# Patient Record
Sex: FEMALE | Race: OTHER | URBAN - METROPOLITAN AREA
[De-identification: names, ages, dates, MRNs, and addresses within clinical notes are randomized per-mention and may not be internally consistent; named-entity substitution may affect disease eponyms.]

---

## 2017-03-01 ENCOUNTER — EMERGENCY (EMERGENCY)
Facility: HOSPITAL | Age: 57
LOS: 1 days | Discharge: HOME | End: 2017-03-01
Attending: EMERGENCY MEDICINE | Admitting: EMERGENCY MEDICINE
Payer: MEDICAID

## 2017-03-01 VITALS
HEIGHT: 62 IN | WEIGHT: 145.95 LBS | OXYGEN SATURATION: 100 % | SYSTOLIC BLOOD PRESSURE: 162 MMHG | TEMPERATURE: 98 F | RESPIRATION RATE: 18 BRPM | DIASTOLIC BLOOD PRESSURE: 73 MMHG | HEART RATE: 65 BPM

## 2017-03-01 VITALS — DIASTOLIC BLOOD PRESSURE: 70 MMHG | HEART RATE: 64 BPM | SYSTOLIC BLOOD PRESSURE: 162 MMHG

## 2017-03-01 DIAGNOSIS — M54.6 PAIN IN THORACIC SPINE: ICD-10-CM

## 2017-03-01 DIAGNOSIS — Z95.5 PRESENCE OF CORONARY ANGIOPLASTY IMPLANT AND GRAFT: Chronic | ICD-10-CM

## 2017-03-01 LAB
APPEARANCE UR: CLEAR — SIGNIFICANT CHANGE UP
APTT BLD: 27.6 SEC — SIGNIFICANT CHANGE UP (ref 27.5–37.4)
BACTERIA # UR AUTO: ABNORMAL /HPF
BASE EXCESS BLDV CALC-SCNC: 3.9 MMOL/L — HIGH (ref -2–2)
BASE EXCESS BLDV CALC-SCNC: 4.9 MMOL/L — HIGH (ref -2–2)
BASOPHILS # BLD AUTO: 0 K/UL — SIGNIFICANT CHANGE UP (ref 0–0.2)
BASOPHILS NFR BLD AUTO: 0.4 % — SIGNIFICANT CHANGE UP (ref 0–2)
BILIRUB UR-MCNC: NEGATIVE — SIGNIFICANT CHANGE UP
CA-I SERPL-SCNC: 1.19 MMOL/L — SIGNIFICANT CHANGE UP (ref 1.12–1.3)
CA-I SERPL-SCNC: 1.2 MMOL/L — SIGNIFICANT CHANGE UP (ref 1.12–1.3)
CHLORIDE BLDV-SCNC: 101 MMOL/L — SIGNIFICANT CHANGE UP (ref 96–108)
CHLORIDE BLDV-SCNC: 98 MMOL/L — SIGNIFICANT CHANGE UP (ref 96–108)
CK MB BLD-MCNC: 1.3 % — SIGNIFICANT CHANGE UP (ref 0–3.5)
CK MB CFR SERPL CALC: 2.4 NG/ML — SIGNIFICANT CHANGE UP (ref 0–3.8)
CK SERPL-CCNC: 187 U/L — HIGH (ref 25–170)
CO2 BLDV-SCNC: 29 MMOL/L — SIGNIFICANT CHANGE UP (ref 22–30)
CO2 BLDV-SCNC: 30 MMOL/L — SIGNIFICANT CHANGE UP (ref 22–30)
COLOR SPEC: SIGNIFICANT CHANGE UP
DIFF PNL FLD: NEGATIVE — SIGNIFICANT CHANGE UP
EOSINOPHIL # BLD AUTO: 0.2 K/UL — SIGNIFICANT CHANGE UP (ref 0–0.5)
EOSINOPHIL NFR BLD AUTO: 1.8 % — SIGNIFICANT CHANGE UP (ref 0–6)
EPI CELLS # UR: SIGNIFICANT CHANGE UP /HPF
GAS PNL BLDV: 137 MMOL/L — SIGNIFICANT CHANGE UP (ref 136–145)
GAS PNL BLDV: 139 MMOL/L — SIGNIFICANT CHANGE UP (ref 136–145)
GAS PNL BLDV: SIGNIFICANT CHANGE UP
GLUCOSE BLDV-MCNC: 203 MG/DL — HIGH (ref 70–99)
GLUCOSE BLDV-MCNC: 252 MG/DL — HIGH (ref 70–99)
GLUCOSE UR QL: 300
HCO3 BLDV-SCNC: 28 MMOL/L — SIGNIFICANT CHANGE UP (ref 21–29)
HCO3 BLDV-SCNC: 28 MMOL/L — SIGNIFICANT CHANGE UP (ref 21–29)
HCT VFR BLD CALC: 36.8 % — SIGNIFICANT CHANGE UP (ref 34.5–45)
HCT VFR BLDA CALC: 38 % — LOW (ref 39–50)
HCT VFR BLDA CALC: 41 % — SIGNIFICANT CHANGE UP (ref 39–50)
HGB BLD CALC-MCNC: 12.5 G/DL — SIGNIFICANT CHANGE UP (ref 11.5–15.5)
HGB BLD CALC-MCNC: 13.5 G/DL — SIGNIFICANT CHANGE UP (ref 11.5–15.5)
HGB BLD-MCNC: 13.2 G/DL — SIGNIFICANT CHANGE UP (ref 11.5–15.5)
INR BLD: 1.06 RATIO — SIGNIFICANT CHANGE UP (ref 0.88–1.16)
KETONES UR-MCNC: ABNORMAL
LACTATE BLDV-MCNC: 1.3 MMOL/L — SIGNIFICANT CHANGE UP (ref 0.7–2)
LACTATE BLDV-MCNC: 2.2 MMOL/L — HIGH (ref 0.7–2)
LEUKOCYTE ESTERASE UR-ACNC: NEGATIVE — SIGNIFICANT CHANGE UP
LYMPHOCYTES # BLD AUTO: 2.9 K/UL — SIGNIFICANT CHANGE UP (ref 1–3.3)
LYMPHOCYTES # BLD AUTO: 25.7 % — SIGNIFICANT CHANGE UP (ref 13–44)
MCHC RBC-ENTMCNC: 30 PG — SIGNIFICANT CHANGE UP (ref 27–34)
MCHC RBC-ENTMCNC: 35.9 GM/DL — SIGNIFICANT CHANGE UP (ref 32–36)
MCV RBC AUTO: 83.6 FL — SIGNIFICANT CHANGE UP (ref 80–100)
MONOCYTES # BLD AUTO: 0.7 K/UL — SIGNIFICANT CHANGE UP (ref 0–0.9)
MONOCYTES NFR BLD AUTO: 5.8 % — SIGNIFICANT CHANGE UP (ref 2–14)
NEUTROPHILS # BLD AUTO: 7.6 K/UL — HIGH (ref 1.8–7.4)
NEUTROPHILS NFR BLD AUTO: 66.4 % — SIGNIFICANT CHANGE UP (ref 43–77)
NITRITE UR-MCNC: NEGATIVE — SIGNIFICANT CHANGE UP
PCO2 BLDV: 38 MMHG — SIGNIFICANT CHANGE UP (ref 35–50)
PCO2 BLDV: 43 MMHG — SIGNIFICANT CHANGE UP (ref 35–50)
PH BLDV: 7.43 — SIGNIFICANT CHANGE UP (ref 7.35–7.45)
PH BLDV: 7.49 — HIGH (ref 7.35–7.45)
PH UR: 7.5 — SIGNIFICANT CHANGE UP (ref 4.8–8)
PLATELET # BLD AUTO: 231 K/UL — SIGNIFICANT CHANGE UP (ref 150–400)
PO2 BLDV: 22 MMHG — LOW (ref 25–45)
PO2 BLDV: 24 MMHG — LOW (ref 25–45)
POTASSIUM BLDV-SCNC: 4 MMOL/L — SIGNIFICANT CHANGE UP (ref 3.5–5)
POTASSIUM BLDV-SCNC: 4.2 MMOL/L — SIGNIFICANT CHANGE UP (ref 3.5–5)
PROT UR-MCNC: 100 MG/DL
PROTHROM AB SERPL-ACNC: 11.5 SEC — SIGNIFICANT CHANGE UP (ref 10–13.1)
RBC # BLD: 4.41 M/UL — SIGNIFICANT CHANGE UP (ref 3.8–5.2)
RBC # FLD: 11.2 % — SIGNIFICANT CHANGE UP (ref 10.3–14.5)
RBC CASTS # UR COMP ASSIST: SIGNIFICANT CHANGE UP /HPF (ref 0–2)
SAO2 % BLDV: 38 % — LOW (ref 67–88)
SAO2 % BLDV: 40 % — LOW (ref 67–88)
SP GR SPEC: 1.01 — SIGNIFICANT CHANGE UP (ref 1.01–1.02)
TROPONIN T SERPL-MCNC: <0.01 NG/ML — SIGNIFICANT CHANGE UP (ref 0–0.06)
UROBILINOGEN FLD QL: NEGATIVE — SIGNIFICANT CHANGE UP
WBC # BLD: 11.4 K/UL — HIGH (ref 3.8–10.5)
WBC # FLD AUTO: 11.4 K/UL — HIGH (ref 3.8–10.5)
WBC UR QL: SIGNIFICANT CHANGE UP /HPF (ref 0–5)

## 2017-03-01 PROCEDURE — 82803 BLOOD GASES ANY COMBINATION: CPT

## 2017-03-01 PROCEDURE — 99285 EMERGENCY DEPT VISIT HI MDM: CPT

## 2017-03-01 PROCEDURE — 71010: CPT | Mod: 26

## 2017-03-01 PROCEDURE — 93005 ELECTROCARDIOGRAM TRACING: CPT

## 2017-03-01 PROCEDURE — 71045 X-RAY EXAM CHEST 1 VIEW: CPT

## 2017-03-01 PROCEDURE — 80053 COMPREHEN METABOLIC PANEL: CPT

## 2017-03-01 PROCEDURE — 85730 THROMBOPLASTIN TIME PARTIAL: CPT

## 2017-03-01 PROCEDURE — 84132 ASSAY OF SERUM POTASSIUM: CPT

## 2017-03-01 PROCEDURE — 82553 CREATINE MB FRACTION: CPT

## 2017-03-01 PROCEDURE — 82435 ASSAY OF BLOOD CHLORIDE: CPT

## 2017-03-01 PROCEDURE — 87086 URINE CULTURE/COLONY COUNT: CPT

## 2017-03-01 PROCEDURE — 83605 ASSAY OF LACTIC ACID: CPT

## 2017-03-01 PROCEDURE — 85379 FIBRIN DEGRADATION QUANT: CPT

## 2017-03-01 PROCEDURE — 82550 ASSAY OF CK (CPK): CPT

## 2017-03-01 PROCEDURE — 84295 ASSAY OF SERUM SODIUM: CPT

## 2017-03-01 PROCEDURE — 85610 PROTHROMBIN TIME: CPT

## 2017-03-01 PROCEDURE — 85027 COMPLETE CBC AUTOMATED: CPT

## 2017-03-01 PROCEDURE — 85014 HEMATOCRIT: CPT

## 2017-03-01 PROCEDURE — 81001 URINALYSIS AUTO W/SCOPE: CPT

## 2017-03-01 PROCEDURE — 82947 ASSAY GLUCOSE BLOOD QUANT: CPT

## 2017-03-01 PROCEDURE — 82330 ASSAY OF CALCIUM: CPT

## 2017-03-01 PROCEDURE — 84484 ASSAY OF TROPONIN QUANT: CPT

## 2017-03-01 PROCEDURE — 99284 EMERGENCY DEPT VISIT MOD MDM: CPT | Mod: 25

## 2017-03-01 RX ORDER — SODIUM CHLORIDE 9 MG/ML
1000 INJECTION INTRAMUSCULAR; INTRAVENOUS; SUBCUTANEOUS ONCE
Qty: 0 | Refills: 0 | Status: COMPLETED | OUTPATIENT
Start: 2017-03-01 | End: 2017-03-01

## 2017-03-01 RX ADMIN — SODIUM CHLORIDE 1000 MILLILITER(S): 9 INJECTION INTRAMUSCULAR; INTRAVENOUS; SUBCUTANEOUS at 16:04

## 2017-03-01 NOTE — ED PROVIDER NOTE - CARE PLAN
Principal Discharge DX:	Back pain, unspecified back location, unspecified back pain laterality, unspecified chronicity  Instructions for follow-up, activity and diet:	Follow up with your Primary Care Physician within the next 2-3 days  Follow up with your Cardiologist Dr Lofton for cardiac workup within the next 2 days as planned  Bring a copy of your test results with you to your appointment  Continue your current medication regimen  Return to the Emergency Room if you experience new or worsening symptoms  Secondary Diagnosis:	Nausea

## 2017-03-01 NOTE — ED PROVIDER NOTE - PROGRESS NOTE DETAILS
patient reports normal nuc stress within the past 5 months. She follows w Cardiologist Dr Lofton w [facility] and has an appointment with him this week. Patient does not want to stay in the hospital for admission for cardiac workup despite a thorough explanation of the reasons why she may should have one within the next 48-72 hours. She has assured me that she will follow up with Dr Lofton within this timeframe.   -Adria Shea PA-C Admission recommended for pt's atypical back pain and vomitng due to concern for acute coronary syndrome.  Patient reports normal nuc stress within the past 5 months. She follows w Cardiologist Dr Lofton w [place] and has an appointment with him this week. Patient does not want to stay in the hospital for admission for cardiac workup despite a thorough explanation of the reasons why she may should have one within the next 48-72 hours. She has assured me that she will follow up with Dr Lofton within this timeframe.   -Adria Shea PA-C

## 2017-03-01 NOTE — ED PROVIDER NOTE - MEDICAL DECISION MAKING DETAILS
Atypical back pain, bilateral CVA/thoracic back.  Non-radiating.  A/W vomiting x 1 this AM.  Concerning risk factors including DM, HTN, hyperlipidemia, CKD.  Concern for atypical acute coronary syndrome, less likely PE/aortic dissection.  Pain mild, not colicky, and improving since this AM.  Labs, EKG, CXR, admit.  S/O pending w/u.  --BMM

## 2017-03-01 NOTE — ED ADULT NURSE NOTE - HARM RISK FACTORS
Check here if all serologies below were negative, non-reactive or immune. Otherwise select appropriate status. no

## 2017-03-01 NOTE — ED ADULT NURSE NOTE - PMH
Depression    Diabetes mellitus type 2 in nonobese    High cholesterol    HTN (hypertension)    Neuropathy    Stented coronary artery  x2

## 2017-03-01 NOTE — ED PROVIDER NOTE - PLAN OF CARE
Follow up with your Primary Care Physician within the next 2-3 days  Follow up with your Cardiologist Dr Lofton for cardiac workup within the next 2 days as planned  Bring a copy of your test results with you to your appointment  Continue your current medication regimen  Return to the Emergency Room if you experience new or worsening symptoms

## 2017-03-01 NOTE — ED ADULT NURSE NOTE - OBJECTIVE STATEMENT
pt with abdiel to er c/o some abd and flank pain with vomited x 1 yellow today denies dysuria skin warm dry hx diabetes x 20years with neuropathy sugar poor control alert verbal Turkmen speaking afebrile skin warm dry fs 242 on arrival sinus rythem on moniter labs sent with pt pending reval

## 2017-03-01 NOTE — ED PROVIDER NOTE - OBJECTIVE STATEMENT
56 F w DM, HTN, HLD, CAD, CKD presents w bilateral midback pain, and nausea since yesterday. She had one episode of vomiting since yesterday. No other complaints. 56 F w DM, HTN, HLD, CAD, CKD presents w bilateral midback pain, and nausea since yesterday. She had one episode of vomiting since yesterday. No other complaints.  States symptoms improving throughout course of the day, pain not related to exertion, may be related to change in position, and mild.  No trauma.  Pt unable to qualify pain well.

## 2017-03-02 LAB
CULTURE RESULTS: SIGNIFICANT CHANGE UP
SPECIMEN SOURCE: SIGNIFICANT CHANGE UP

## 2017-07-19 ENCOUNTER — EMERGENCY (EMERGENCY)
Facility: HOSPITAL | Age: 57
LOS: 1 days | Discharge: HOME | End: 2017-07-19
Attending: EMERGENCY MEDICINE | Admitting: EMERGENCY MEDICINE
Payer: MEDICAID

## 2017-07-19 VITALS
TEMPERATURE: 100 F | OXYGEN SATURATION: 99 % | RESPIRATION RATE: 20 BRPM | DIASTOLIC BLOOD PRESSURE: 64 MMHG | HEART RATE: 74 BPM | SYSTOLIC BLOOD PRESSURE: 106 MMHG

## 2017-07-19 DIAGNOSIS — Z79.02 LONG TERM (CURRENT) USE OF ANTITHROMBOTICS/ANTIPLATELETS: ICD-10-CM

## 2017-07-19 DIAGNOSIS — Z79.899 OTHER LONG TERM (CURRENT) DRUG THERAPY: ICD-10-CM
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
## 2017-07-19 DIAGNOSIS — S93.324A DISLOCATION OF TARSOMETATARSAL JOINT OF RIGHT FOOT, INITIAL ENCOUNTER: ICD-10-CM

## 2017-07-19 DIAGNOSIS — Z79.4 LONG TERM (CURRENT) USE OF INSULIN: ICD-10-CM
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
## 2017-07-19 DIAGNOSIS — Z95.5 PRESENCE OF CORONARY ANGIOPLASTY IMPLANT AND GRAFT: Chronic | ICD-10-CM

## 2017-07-19 DIAGNOSIS — I10 ESSENTIAL (PRIMARY) HYPERTENSION: ICD-10-CM

## 2017-07-19 DIAGNOSIS — E78.00 PURE HYPERCHOLESTEROLEMIA, UNSPECIFIED: ICD-10-CM
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
## 2017-07-19 DIAGNOSIS — S90.31XA CONTUSION OF RIGHT FOOT, INITIAL ENCOUNTER: ICD-10-CM

## 2017-07-19 DIAGNOSIS — E11.9 TYPE 2 DIABETES MELLITUS WITHOUT COMPLICATIONS: ICD-10-CM

## 2017-07-19 DIAGNOSIS — Z79.82 LONG TERM (CURRENT) USE OF ASPIRIN: ICD-10-CM

## 2017-07-19 PROCEDURE — 76377 3D RENDER W/INTRP POSTPROCES: CPT

## 2017-07-19 PROCEDURE — 73700 CT LOWER EXTREMITY W/O DYE: CPT | Mod: 26,RT

## 2017-07-19 PROCEDURE — 73700 CT LOWER EXTREMITY W/O DYE: CPT

## 2017-07-19 PROCEDURE — 76377 3D RENDER W/INTRP POSTPROCES: CPT | Mod: 26

## 2017-07-19 PROCEDURE — 73630 X-RAY EXAM OF FOOT: CPT

## 2017-07-19 PROCEDURE — 73630 X-RAY EXAM OF FOOT: CPT | Mod: 26,RT

## 2017-07-19 PROCEDURE — 99284 EMERGENCY DEPT VISIT MOD MDM: CPT | Mod: 25

## 2017-07-19 PROCEDURE — 99285 EMERGENCY DEPT VISIT HI MDM: CPT

## 2017-07-19 NOTE — CONSULT NOTE ADULT - ASSESSMENT
57F right foot dorsolateral lisfranc dislocation of mets 2 - 5 and possible medial dislocation of 1st met-medial cuneiform  - Pt seen and evaluated  - Vimal bulky dressing and posterior splint applied  - No signs of ischemic changes or concerns for compartment syndrome  - Will require ORIF once swelling is controlled  - Recommend tight control of diabetes  - Strict NWB to RLE with crutches  - Keep dressing CDI until follow-up  - Rest, Ice, Elevate, NSAID for pain and swelling control

## 2017-07-19 NOTE — ED PROVIDER NOTE - PHYSICAL EXAMINATION
Attending note. Patient is alert in no distress. Examination of the right lower extremity we'll see tender swelling of the dorsum of the right foot with maximal pain in the midfoot area. Patient has paresthesia in a stocking distribution. Distal pulses are strong. Patient has good capillary refill. Patient has healing abrasions on the dorsum of the foot.

## 2017-07-19 NOTE — ED PROVIDER NOTE - PLAN OF CARE
1. Keep splint intact until follow-up with podiatry this week  2. Continue to take tylenol for any pain as needed   3. Rest, ice and elevate your foot to ensure decrease of swelling   4. Follow-up with podiatry doctor Dr. Rachel Arora, at [phone].  5. Return to the ER for any new or worsening symptoms.

## 2017-07-19 NOTE — ED PROVIDER NOTE - PROGRESS NOTE DETAILS
podiatry saw and evaluated patient. recommending CT of foot for OR planning as outpatient, but reporting no surgery to be done today. patient splinted by podiatry and plan discussed with family who agrees with plan. pending CT at this time will d/c with this follow-up plan. -Mimi Mendez PA-C podiatry saw and evaluated patient. recommending CT of foot for OR planning as outpatient, but reporting no surgery to be done today. patient splinted by podiatry and plan discussed with family who agrees with plan. pending CT at this time will d/c with this follow-up plan. Podiatry resident states do not need to wait for CT results, only purpose is for OR planning will not cause change in plan.  -OLYA KenneyC

## 2017-07-19 NOTE — CONSULT NOTE ADULT - SUBJECTIVE AND OBJECTIVE BOX
Patient is a 57y old  Female who presents with a chief complaint of right foot swelling.    HPI: Pt injured her right foot 3 weeks ago. Patient has diabetic neuropathy and is complaining of pain and swelling with ecchymosis of the right foot. Patient was seen by her foot doctor and was told there were no fractures and followed in 6 weeks.  This is complaining of pain and swelling. Pain is exacerbated by weightbearing. Pt was given a wrapping for her foot and has been ambulating in regular shoes since her injury. Pt was going to see her podiatrist but pain and swelling worsened which made her come to the ED today.      PAST MEDICAL & SURGICAL HISTORY:  Depression  Neuropathy  High cholesterol  HTN (hypertension)  Stented coronary artery: x2  Diabetes mellitus type 2 in nonobese  History of coronary artery stent placement: x2      MEDICATIONS  (STANDING):    MEDICATIONS  (PRN):      Allergies    No Known Allergies    Intolerances        VITALS:    Vital Signs Last 24 Hrs  T(C): 37.5 (19 Jul 2017 14:18), Max: 37.5 (19 Jul 2017 14:18)  T(F): 99.5 (19 Jul 2017 14:18), Max: 99.5 (19 Jul 2017 14:18)  HR: 74 (19 Jul 2017 14:18) (74 - 74)  BP: 106/64 (19 Jul 2017 14:18) (106/64 - 106/64)  BP(mean): --  RR: 20 (19 Jul 2017 14:18) (20 - 20)  SpO2: 99% (19 Jul 2017 14:18) (99% - 99%)    LABS:                CAPILLARY BLOOD GLUCOSE              LOWER EXTREMITY PHYSICAL EXAM:    Vasular: DP/PT 2/4, B/L, CFT <2 seconds B/L, Temperature gradient WNL, B/L.   Neuro: Epicritic sensation absent to the level of ankle, B/L.  Musculoskeletal/Ortho: red and swollen right midfoot, no blanching or tenting of skin, no open lesions, ROM of each toes intact but decreased, no pain throughout ROM of toes, ecchymosis plantar midfoot, unable to weightbear due to pain.    RADIOLOGY & ADDITIONAL STUDIES:

## 2017-07-19 NOTE — ED PROVIDER NOTE - OBJECTIVE STATEMENT
Attending note. Patient was seen in fast track #3. Injured her right foot on June 27. Patient has diabetic neuropathy and is complaining of pain and swelling with ecchymosis of the right foot. Patient was seen by her foot doctor and was told there were no fractures and followed in 5 weeks.  This is complaining of pain and swelling. Pain is exacerbated by weightbearing.

## 2017-07-19 NOTE — ED PROVIDER NOTE - CARE PLAN
Principal Discharge DX:	Lisfranc dislocation, right, initial encounter  Instructions for follow-up, activity and diet:	1. Keep splint intact until follow-up with podiatry this week  2. Continue to take tylenol for any pain as needed   3. Rest, ice and elevate your foot to ensure decrease of swelling   4. Follow-up with podiatry doctor Dr. Rachel Arora, at [phone].  5. Return to the ER for any new or worsening symptoms. Principal Discharge DX:	Lisfranc dislocation, right, initial encounter  Instructions for follow-up, activity and diet:	1. Keep splint intact until follow-up with podiatry this week  2. Continue to take tylenol for any pain as needed   3. Rest, ice and elevate your foot to ensure decrease of swelling   4. Follow-up with podiatry doctor Dr. Rachel Arora, at [phone].  5. Return to the ER for any new or worsening symptoms.

## 2019-04-08 ENCOUNTER — EMERGENCY (EMERGENCY)
Facility: HOSPITAL | Age: 59
LOS: 1 days | Discharge: HOME | End: 2019-04-08
Attending: EMERGENCY MEDICINE
Payer: COMMERCIAL

## 2019-04-08 VITALS
SYSTOLIC BLOOD PRESSURE: 140 MMHG | TEMPERATURE: 98 F | HEART RATE: 59 BPM | DIASTOLIC BLOOD PRESSURE: 67 MMHG | OXYGEN SATURATION: 99 % | RESPIRATION RATE: 16 BRPM

## 2019-04-08 VITALS
DIASTOLIC BLOOD PRESSURE: 75 MMHG | HEART RATE: 64 BPM | TEMPERATURE: 98 F | HEIGHT: 61 IN | SYSTOLIC BLOOD PRESSURE: 157 MMHG | OXYGEN SATURATION: 100 % | WEIGHT: 149.91 LBS | RESPIRATION RATE: 18 BRPM

## 2019-04-08 DIAGNOSIS — Z95.5 PRESENCE OF CORONARY ANGIOPLASTY IMPLANT AND GRAFT: Chronic | ICD-10-CM

## 2019-04-08 PROBLEM — G62.9 POLYNEUROPATHY, UNSPECIFIED: Chronic | Status: ACTIVE | Noted: 2017-03-01

## 2019-04-08 PROBLEM — I10 ESSENTIAL (PRIMARY) HYPERTENSION: Chronic | Status: ACTIVE | Noted: 2017-03-01

## 2019-04-08 PROBLEM — E78.00 PURE HYPERCHOLESTEROLEMIA, UNSPECIFIED: Chronic | Status: ACTIVE | Noted: 2017-03-01

## 2019-04-08 PROBLEM — F32.9 MAJOR DEPRESSIVE DISORDER, SINGLE EPISODE, UNSPECIFIED: Chronic | Status: ACTIVE | Noted: 2017-03-01

## 2019-04-08 PROBLEM — E11.9 TYPE 2 DIABETES MELLITUS WITHOUT COMPLICATIONS: Chronic | Status: ACTIVE | Noted: 2017-03-01

## 2019-04-08 LAB
ALBUMIN SERPL ELPH-MCNC: 4.3 G/DL — SIGNIFICANT CHANGE UP (ref 3.3–5)
ALP SERPL-CCNC: 66 U/L — SIGNIFICANT CHANGE UP (ref 40–120)
ALT FLD-CCNC: 17 U/L — SIGNIFICANT CHANGE UP (ref 10–45)
ANION GAP SERPL CALC-SCNC: 17 MMOL/L — SIGNIFICANT CHANGE UP (ref 5–17)
AST SERPL-CCNC: 19 U/L — SIGNIFICANT CHANGE UP (ref 10–40)
BASOPHILS # BLD AUTO: 0.1 K/UL — SIGNIFICANT CHANGE UP (ref 0–0.2)
BASOPHILS NFR BLD AUTO: 0.9 % — SIGNIFICANT CHANGE UP (ref 0–2)
BILIRUB SERPL-MCNC: 0.3 MG/DL — SIGNIFICANT CHANGE UP (ref 0.2–1.2)
BUN SERPL-MCNC: 21 MG/DL — SIGNIFICANT CHANGE UP (ref 7–23)
CALCIUM SERPL-MCNC: 10.2 MG/DL — SIGNIFICANT CHANGE UP (ref 8.4–10.5)
CHLORIDE SERPL-SCNC: 96 MMOL/L — SIGNIFICANT CHANGE UP (ref 96–108)
CO2 SERPL-SCNC: 23 MMOL/L — SIGNIFICANT CHANGE UP (ref 22–31)
CREAT SERPL-MCNC: 0.87 MG/DL — SIGNIFICANT CHANGE UP (ref 0.5–1.3)
EOSINOPHIL # BLD AUTO: 0.6 K/UL — HIGH (ref 0–0.5)
EOSINOPHIL NFR BLD AUTO: 7.6 % — HIGH (ref 0–6)
GLUCOSE SERPL-MCNC: 311 MG/DL — HIGH (ref 70–99)
HCT VFR BLD CALC: 32.4 % — LOW (ref 34.5–45)
HGB BLD-MCNC: 11.2 G/DL — LOW (ref 11.5–15.5)
LYMPHOCYTES # BLD AUTO: 2.3 K/UL — SIGNIFICANT CHANGE UP (ref 1–3.3)
LYMPHOCYTES # BLD AUTO: 31.8 % — SIGNIFICANT CHANGE UP (ref 13–44)
MCHC RBC-ENTMCNC: 28.7 PG — SIGNIFICANT CHANGE UP (ref 27–34)
MCHC RBC-ENTMCNC: 34.7 GM/DL — SIGNIFICANT CHANGE UP (ref 32–36)
MCV RBC AUTO: 82.6 FL — SIGNIFICANT CHANGE UP (ref 80–100)
MONOCYTES # BLD AUTO: 0.4 K/UL — SIGNIFICANT CHANGE UP (ref 0–0.9)
MONOCYTES NFR BLD AUTO: 5.4 % — SIGNIFICANT CHANGE UP (ref 2–14)
NEUTROPHILS # BLD AUTO: 3.9 K/UL — SIGNIFICANT CHANGE UP (ref 1.8–7.4)
NEUTROPHILS NFR BLD AUTO: 54.3 % — SIGNIFICANT CHANGE UP (ref 43–77)
PLATELET # BLD AUTO: 346 K/UL — SIGNIFICANT CHANGE UP (ref 150–400)
POTASSIUM SERPL-MCNC: 5.4 MMOL/L — HIGH (ref 3.5–5.3)
POTASSIUM SERPL-SCNC: 5.4 MMOL/L — HIGH (ref 3.5–5.3)
PROT SERPL-MCNC: 7.5 G/DL — SIGNIFICANT CHANGE UP (ref 6–8.3)
RAPID RVP RESULT: DETECTED
RBC # BLD: 3.92 M/UL — SIGNIFICANT CHANGE UP (ref 3.8–5.2)
RBC # FLD: 11 % — SIGNIFICANT CHANGE UP (ref 10.3–14.5)
RV+EV RNA SPEC QL NAA+PROBE: DETECTED
SODIUM SERPL-SCNC: 136 MMOL/L — SIGNIFICANT CHANGE UP (ref 135–145)
WBC # BLD: 7.3 K/UL — SIGNIFICANT CHANGE UP (ref 3.8–10.5)
WBC # FLD AUTO: 7.3 K/UL — SIGNIFICANT CHANGE UP (ref 3.8–10.5)

## 2019-04-08 PROCEDURE — 87486 CHLMYD PNEUM DNA AMP PROBE: CPT

## 2019-04-08 PROCEDURE — 87581 M.PNEUMON DNA AMP PROBE: CPT

## 2019-04-08 PROCEDURE — 80053 COMPREHEN METABOLIC PANEL: CPT

## 2019-04-08 PROCEDURE — 71260 CT THORAX DX C+: CPT

## 2019-04-08 PROCEDURE — 87798 DETECT AGENT NOS DNA AMP: CPT

## 2019-04-08 PROCEDURE — 99284 EMERGENCY DEPT VISIT MOD MDM: CPT | Mod: 25

## 2019-04-08 PROCEDURE — 85027 COMPLETE CBC AUTOMATED: CPT

## 2019-04-08 PROCEDURE — 93005 ELECTROCARDIOGRAM TRACING: CPT

## 2019-04-08 PROCEDURE — 71046 X-RAY EXAM CHEST 2 VIEWS: CPT

## 2019-04-08 PROCEDURE — 93010 ELECTROCARDIOGRAM REPORT: CPT

## 2019-04-08 PROCEDURE — 71046 X-RAY EXAM CHEST 2 VIEWS: CPT | Mod: 26

## 2019-04-08 PROCEDURE — 71260 CT THORAX DX C+: CPT | Mod: 26

## 2019-04-08 PROCEDURE — 87633 RESP VIRUS 12-25 TARGETS: CPT

## 2019-04-08 NOTE — ED PROVIDER NOTE - OBJECTIVE STATEMENT
60 yo female, hx of pulmonary abscess drained in [place] 6 years ago, p/w cough x 1 month, productive yellow sputum, on PO abx/decongestants as an outpatient from urgent care without relief, subjective fevers, no LOC, no diarrhea, no sick contacts, endorses intermittent mild chest wall pain when coughing, none at rest.

## 2019-04-08 NOTE — ED ADULT TRIAGE NOTE - CHIEF COMPLAINT QUOTE
coughing yellow sputum x1 month with SOB and rib pain. seen by PCP and urgent care- given zpack 10 days ago and completed course with no improvement. patient denies fevers.

## 2019-04-08 NOTE — ED PROVIDER NOTE - NSFOLLOWUPINSTRUCTIONS_ED_ALL_ED_FT
FOLLOW UP WITH DR. ARCHULETA IN THE NEXT 24-48H ([phone]).     CONTINUE TO TAKE MUCINEX AS NEEDED FOR CONGESTION.    RETURN TO THE ED FOR ANY NEW OR WORSENING SYMPTOMS.

## 2019-04-08 NOTE — ED PROVIDER NOTE - INTERPRETATION
EKG reviewed for rate, rhythm, axis, intervals and segments, including QRS morphology, P wave appearance T wave appearance, HI interval, and QT interval.  I find the EKG to be unremarkable in all of these regards except as follows: poor R progression (?lead placement given large R in V4), borderline bradycardia

## 2019-04-08 NOTE — ED ADULT NURSE NOTE - OBJECTIVE STATEMENT
60 y/o Female presenting to the ED ambulatory, A&Ox3, complaining of a cough for over a month. Pt reports about a month ago pt developed a fever and cough and was given antibiotics and other medications and the symptoms have not all resolved. Pt denies fever since she started any medication. Pt reports a chest pain and pain in her ribs when she coughs. Pt reports feeling short of breath at times. Pt has a non-productive cough at this time. After nebulizer treatments pt has yellow sputum that is able to come up but not without it. Lung sounds unable to be auscultated at this time because patient coughs with each deep breath. Heart sounds WNL. Pt denies dizziness, headache, weakness, N/V/D, numbness, tingling, congestion, fever, chills, sick contacts, recent travel. Pt appears to be in no current distress. Pt was Qdocked and presented to primary RN with a 20 in her left AC, flushes and pulls back. Pt has already gone to X-ray. Awaiting CT of the chest. Safety and comfort measures provided.

## 2019-04-08 NOTE — ED PROVIDER NOTE - NS ED ROS FT
CONSTITUTIONAL: + fevers  HEENT: no dysphagia  CV: + chest pain  RESP: + cough  GI: no nausea/vomiting  : no dysuria  DERM: no rash  MSK: no back pain  NEURO: no LOC  ENDO: + diabetes

## 2019-04-08 NOTE — ED PROVIDER NOTE - PHYSICAL EXAMINATION
GEN: calm, cooperative, ENT: mucous membranes moist, HEAD: NCAT, CV: S1 S2, RESP: no respiratory distress, scattered rhonchi, slight productive cough on deep inspiration, ABD: no abdominal TTP, MSK: moves all extremities, NEURO: awake, alert, oriented, PSYCH: affect normal

## 2019-04-08 NOTE — ED STATDOCS - OBJECTIVE STATEMENT
59 year old F with pmhx HTN, HLD, DM and pshx of stents x2 (2014) and lung fluid drainage presents to ED c/o cough x2 months. Went to Urgent Care this week and prescribed Z pack and Mucinex, has been taking Z pack x5 days with no improvement. X-ray not performed at Urgent Care. +constant CP and productive cough with yellow mucous. CP is different to when pt had to have stents placed.  No recent travel. Denies fever, chills, leg pain and swelling, weight loss, hemoptysis 59 year old F with pmhx HTN, HLD, DM and pshx of stents x2 (2014) and lung fluid drainage presents to ED c/o cough x2 months. Went to Urgent Care this week and prescribed Z pack and Mucinex, has been taking Z pack x5 days with no improvement. X-ray not performed at Urgent Care. +constant CP, SOB when coughing, and productive cough with yellow mucous. CP is different to when pt had to have stents placed.  No recent travel. Denies fever, chills, leg pain and swelling, weight loss, hemoptysis

## 2019-11-04 ENCOUNTER — EMERGENCY (EMERGENCY)
Facility: HOSPITAL | Age: 59
LOS: 1 days | Discharge: HOME | End: 2019-11-04
Attending: EMERGENCY MEDICINE
Payer: MEDICAID

## 2019-11-04 VITALS
DIASTOLIC BLOOD PRESSURE: 78 MMHG | RESPIRATION RATE: 17 BRPM | OXYGEN SATURATION: 95 % | HEART RATE: 73 BPM | TEMPERATURE: 98 F | SYSTOLIC BLOOD PRESSURE: 176 MMHG

## 2019-11-04 VITALS
HEART RATE: 85 BPM | RESPIRATION RATE: 18 BRPM | WEIGHT: 145.06 LBS | HEIGHT: 62 IN | DIASTOLIC BLOOD PRESSURE: 79 MMHG | SYSTOLIC BLOOD PRESSURE: 174 MMHG | TEMPERATURE: 98 F | OXYGEN SATURATION: 100 %

## 2019-11-04 DIAGNOSIS — Z95.5 PRESENCE OF CORONARY ANGIOPLASTY IMPLANT AND GRAFT: Chronic | ICD-10-CM

## 2019-11-04 LAB
ALBUMIN SERPL ELPH-MCNC: 4.3 G/DL — SIGNIFICANT CHANGE UP (ref 3.3–5)
ALP SERPL-CCNC: 86 U/L — SIGNIFICANT CHANGE UP (ref 40–120)
ALT FLD-CCNC: 17 U/L — SIGNIFICANT CHANGE UP (ref 10–45)
ANION GAP SERPL CALC-SCNC: 13 MMOL/L — SIGNIFICANT CHANGE UP (ref 5–17)
AST SERPL-CCNC: 23 U/L — SIGNIFICANT CHANGE UP (ref 10–40)
BASOPHILS # BLD AUTO: 0.06 K/UL — SIGNIFICANT CHANGE UP (ref 0–0.2)
BASOPHILS NFR BLD AUTO: 0.9 % — SIGNIFICANT CHANGE UP (ref 0–2)
BILIRUB SERPL-MCNC: 0.4 MG/DL — SIGNIFICANT CHANGE UP (ref 0.2–1.2)
BUN SERPL-MCNC: 20 MG/DL — SIGNIFICANT CHANGE UP (ref 7–23)
CALCIUM SERPL-MCNC: 9.6 MG/DL — SIGNIFICANT CHANGE UP (ref 8.4–10.5)
CHLORIDE SERPL-SCNC: 99 MMOL/L — SIGNIFICANT CHANGE UP (ref 96–108)
CO2 SERPL-SCNC: 24 MMOL/L — SIGNIFICANT CHANGE UP (ref 22–31)
CREAT SERPL-MCNC: 0.73 MG/DL — SIGNIFICANT CHANGE UP (ref 0.5–1.3)
EOSINOPHIL # BLD AUTO: 0.51 K/UL — HIGH (ref 0–0.5)
EOSINOPHIL NFR BLD AUTO: 7.6 % — HIGH (ref 0–6)
GLUCOSE SERPL-MCNC: 320 MG/DL — HIGH (ref 70–99)
HCT VFR BLD CALC: 33 % — LOW (ref 34.5–45)
HGB BLD-MCNC: 11.2 G/DL — LOW (ref 11.5–15.5)
IMM GRANULOCYTES NFR BLD AUTO: 0.3 % — SIGNIFICANT CHANGE UP (ref 0–1.5)
LIDOCAIN IGE QN: 70 U/L — HIGH (ref 7–60)
LYMPHOCYTES # BLD AUTO: 2.29 K/UL — SIGNIFICANT CHANGE UP (ref 1–3.3)
LYMPHOCYTES # BLD AUTO: 34 % — SIGNIFICANT CHANGE UP (ref 13–44)
MCHC RBC-ENTMCNC: 28.3 PG — SIGNIFICANT CHANGE UP (ref 27–34)
MCHC RBC-ENTMCNC: 33.9 GM/DL — SIGNIFICANT CHANGE UP (ref 32–36)
MCV RBC AUTO: 83.3 FL — SIGNIFICANT CHANGE UP (ref 80–100)
MONOCYTES # BLD AUTO: 0.42 K/UL — SIGNIFICANT CHANGE UP (ref 0–0.9)
MONOCYTES NFR BLD AUTO: 6.2 % — SIGNIFICANT CHANGE UP (ref 2–14)
NEUTROPHILS # BLD AUTO: 3.43 K/UL — SIGNIFICANT CHANGE UP (ref 1.8–7.4)
NEUTROPHILS NFR BLD AUTO: 51 % — SIGNIFICANT CHANGE UP (ref 43–77)
NRBC # BLD: 0 /100 WBCS — SIGNIFICANT CHANGE UP (ref 0–0)
PLATELET # BLD AUTO: 189 K/UL — SIGNIFICANT CHANGE UP (ref 150–400)
POTASSIUM SERPL-MCNC: 4.6 MMOL/L — SIGNIFICANT CHANGE UP (ref 3.5–5.3)
POTASSIUM SERPL-SCNC: 4.6 MMOL/L — SIGNIFICANT CHANGE UP (ref 3.5–5.3)
PROT SERPL-MCNC: 6.9 G/DL — SIGNIFICANT CHANGE UP (ref 6–8.3)
RBC # BLD: 3.96 M/UL — SIGNIFICANT CHANGE UP (ref 3.8–5.2)
RBC # FLD: 13 % — SIGNIFICANT CHANGE UP (ref 10.3–14.5)
SODIUM SERPL-SCNC: 136 MMOL/L — SIGNIFICANT CHANGE UP (ref 135–145)
WBC # BLD: 6.73 K/UL — SIGNIFICANT CHANGE UP (ref 3.8–10.5)
WBC # FLD AUTO: 6.73 K/UL — SIGNIFICANT CHANGE UP (ref 3.8–10.5)

## 2019-11-04 PROCEDURE — 73080 X-RAY EXAM OF ELBOW: CPT

## 2019-11-04 PROCEDURE — 99284 EMERGENCY DEPT VISIT MOD MDM: CPT

## 2019-11-04 PROCEDURE — 82962 GLUCOSE BLOOD TEST: CPT

## 2019-11-04 PROCEDURE — 80053 COMPREHEN METABOLIC PANEL: CPT

## 2019-11-04 PROCEDURE — 83690 ASSAY OF LIPASE: CPT

## 2019-11-04 PROCEDURE — 71045 X-RAY EXAM CHEST 1 VIEW: CPT | Mod: 26

## 2019-11-04 PROCEDURE — 71045 X-RAY EXAM CHEST 1 VIEW: CPT

## 2019-11-04 PROCEDURE — 73080 X-RAY EXAM OF ELBOW: CPT | Mod: 26,LT

## 2019-11-04 PROCEDURE — 85027 COMPLETE CBC AUTOMATED: CPT

## 2019-11-04 RX ORDER — IBUPROFEN 200 MG
600 TABLET ORAL ONCE
Refills: 0 | Status: COMPLETED | OUTPATIENT
Start: 2019-11-04 | End: 2019-11-04

## 2019-11-04 RX ADMIN — Medication 600 MILLIGRAM(S): at 18:34

## 2019-11-04 NOTE — ED ADULT NURSE NOTE - OBJECTIVE STATEMENT
58 yo female presents to ED from home c/o 2 weeks of hyperglycemia, "blood sugar in 400s in the morning" and L arm pain s/p fall. Patient states she slipped on wet floor and landed on L elbow 2 weeks ago. Patient denies hitting head/LOC/dizziness. Patient states she has been seeing PCP for hyperglycemia and has had insulin adjusted. Patient denies SOB, CP, nvd, fever/chills, sick contacts. Patient A&Ox3, breathing spontaneously, airway patent, bl clear lungs, abdomen nontender, +pulses, cap refill <2 seconds. Patient resting in bed, side rails up, plan of care explained.

## 2019-11-04 NOTE — ED PROVIDER NOTE - ATTENDING CONTRIBUTION TO CARE
attending Thelma: 59yF h/o DMII on insulin and glipizide p/w elevated blood sugars x 2 weeks. Seen by endocrinologist last week who adjusted short and long acting insulin dosing without improvement. Also with slip and fall 2 weeks ago landing on L arm with persistent pain to L elbow. Denies head trauma or LOC. No infectious symptoms. Will obtain labs, xray elbow, attempt to reach outpatient provider, reassess.

## 2019-11-04 NOTE — ED PROVIDER NOTE - NSFOLLOWUPINSTRUCTIONS_ED_ALL_ED_FT
stay hydrated  Follow up with your Primary Care Physician within the next 2-3 days  Bring a copy of your test results with you to your appointment  Continue your current medication regimen  Return to the Emergency Room if you experience new or worsening symptoms  Take Motrin 400-600mg every 6 hrs as needed for pain. Take with food   Continue your diabetic medications as prescribed

## 2019-11-04 NOTE — ED PROVIDER NOTE - OBJECTIVE STATEMENT
58 y/o female hx of DM presents to the ED for hyperglycemia. patient states sugars have been as high as 400s over the last 2 weeks, saw endocrinologist who increased lantus from 60 to 70 units and then rapid acting from 20 units to 30 to with meals. patient also endorsing left elbow pain after slip and fall on wet kitchen floor. no headstrike or loc. saw PMD, no xrayys done was given pain cream without relief. tried taking tylenol x 2 without relief.

## 2019-11-04 NOTE — ED ADULT NURSE NOTE - NSIMPLEMENTINTERV_GEN_ALL_ED
Implemented All Fall Risk Interventions:  [place] to call system. Call bell, personal items and telephone within reach. Instruct patient to call for assistance. Room bathroom lighting operational. Non-slip footwear when patient is off stretcher. Physically safe environment: no spills, clutter or unnecessary equipment. Stretcher in lowest position, wheels locked, appropriate side rails in place. Provide visual cue, wrist band, yellow gown, etc. Monitor gait and stability. Monitor for mental status changes and reorient to person, place, and time. Review medications for side effects contributing to fall risk. Reinforce activity limits and safety measures with patient and family.

## 2019-11-04 NOTE — ED PROVIDER NOTE - PATIENT PORTAL LINK FT
You can access the FollowMyHealth Patient Portal offered by [facility] by registering at the following website: [email or web address]. By joining [employer]’s FollowMyHealth portal, you will also be able to view your health information using other applications (apps) compatible with our system.

## 2020-03-01 ENCOUNTER — OUTPATIENT (OUTPATIENT)
Dept: OUTPATIENT SERVICES | Facility: HOSPITAL | Age: 60
LOS: 1 days | End: 2020-03-01

## 2020-03-01 DIAGNOSIS — Z95.5 PRESENCE OF CORONARY ANGIOPLASTY IMPLANT AND GRAFT: Chronic | ICD-10-CM

## 2020-03-19 ENCOUNTER — EMERGENCY (EMERGENCY)
Facility: HOSPITAL | Age: 60
LOS: 0 days | Discharge: HOME | End: 2020-03-19
Attending: EMERGENCY MEDICINE
Payer: COMMERCIAL

## 2020-03-19 VITALS
OXYGEN SATURATION: 100 % | SYSTOLIC BLOOD PRESSURE: 175 MMHG | HEART RATE: 64 BPM | TEMPERATURE: 99 F | RESPIRATION RATE: 16 BRPM | DIASTOLIC BLOOD PRESSURE: 64 MMHG

## 2020-03-19 VITALS
HEIGHT: 62 IN | SYSTOLIC BLOOD PRESSURE: 117 MMHG | DIASTOLIC BLOOD PRESSURE: 58 MMHG | HEART RATE: 60 BPM | WEIGHT: 154.1 LBS | RESPIRATION RATE: 97 BRPM | OXYGEN SATURATION: 99 % | TEMPERATURE: 99 F

## 2020-03-19 DIAGNOSIS — B95.61 METHICILLIN SUSCEPTIBLE STAPHYLOCOCCUS AUREUS INFECTION AS THE CAUSE OF DISEASES CLASSIFIED ELSEWHERE: ICD-10-CM

## 2020-03-19 DIAGNOSIS — E78.5 HYPERLIPIDEMIA, UNSPECIFIED: ICD-10-CM

## 2020-03-19 DIAGNOSIS — E11.40 TYPE 2 DIABETES MELLITUS WITH DIABETIC NEUROPATHY, UNSPECIFIED: ICD-10-CM

## 2020-03-19 DIAGNOSIS — Z79.4 LONG TERM (CURRENT) USE OF INSULIN: ICD-10-CM

## 2020-03-19 DIAGNOSIS — E11.621 TYPE 2 DIABETES MELLITUS WITH FOOT ULCER: ICD-10-CM

## 2020-03-19 DIAGNOSIS — F32.9 MAJOR DEPRESSIVE DISORDER, SINGLE EPISODE, UNSPECIFIED: ICD-10-CM

## 2020-03-19 DIAGNOSIS — Z79.82 LONG TERM (CURRENT) USE OF ASPIRIN: ICD-10-CM

## 2020-03-19 DIAGNOSIS — Z95.5 PRESENCE OF CORONARY ANGIOPLASTY IMPLANT AND GRAFT: Chronic | ICD-10-CM

## 2020-03-19 DIAGNOSIS — I25.10 ATHEROSCLEROTIC HEART DISEASE OF NATIVE CORONARY ARTERY WITHOUT ANGINA PECTORIS: ICD-10-CM

## 2020-03-19 DIAGNOSIS — L97.519 NON-PRESSURE CHRONIC ULCER OF OTHER PART OF RIGHT FOOT WITH UNSPECIFIED SEVERITY: ICD-10-CM

## 2020-03-19 DIAGNOSIS — I10 ESSENTIAL (PRIMARY) HYPERTENSION: ICD-10-CM

## 2020-03-19 DIAGNOSIS — Z95.5 PRESENCE OF CORONARY ANGIOPLASTY IMPLANT AND GRAFT: ICD-10-CM

## 2020-03-19 DIAGNOSIS — Z79.02 LONG TERM (CURRENT) USE OF ANTITHROMBOTICS/ANTIPLATELETS: ICD-10-CM

## 2020-03-19 LAB
ALBUMIN SERPL ELPH-MCNC: 3.3 G/DL — SIGNIFICANT CHANGE UP (ref 3.3–5)
ALP SERPL-CCNC: 136 U/L — HIGH (ref 40–120)
ALT FLD-CCNC: 38 U/L — SIGNIFICANT CHANGE UP (ref 12–78)
ANION GAP SERPL CALC-SCNC: 5 MMOL/L — SIGNIFICANT CHANGE UP (ref 5–17)
APTT BLD: 30.1 SEC — SIGNIFICANT CHANGE UP (ref 27.5–36.3)
AST SERPL-CCNC: 28 U/L — SIGNIFICANT CHANGE UP (ref 15–37)
BASOPHILS # BLD AUTO: 0.08 K/UL — SIGNIFICANT CHANGE UP (ref 0–0.2)
BASOPHILS NFR BLD AUTO: 0.7 % — SIGNIFICANT CHANGE UP (ref 0–2)
BILIRUB SERPL-MCNC: 0.8 MG/DL — SIGNIFICANT CHANGE UP (ref 0.2–1.2)
BUN SERPL-MCNC: 26 MG/DL — HIGH (ref 7–23)
CALCIUM SERPL-MCNC: 9.2 MG/DL — SIGNIFICANT CHANGE UP (ref 8.5–10.1)
CHLORIDE SERPL-SCNC: 99 MMOL/L — SIGNIFICANT CHANGE UP (ref 96–108)
CO2 SERPL-SCNC: 29 MMOL/L — SIGNIFICANT CHANGE UP (ref 22–31)
CREAT SERPL-MCNC: 1.01 MG/DL — SIGNIFICANT CHANGE UP (ref 0.5–1.3)
EOSINOPHIL # BLD AUTO: 0.45 K/UL — SIGNIFICANT CHANGE UP (ref 0–0.5)
EOSINOPHIL NFR BLD AUTO: 3.7 % — SIGNIFICANT CHANGE UP (ref 0–6)
ERYTHROCYTE [SEDIMENTATION RATE] IN BLOOD: 75 MM/HR — HIGH (ref 0–20)
GLUCOSE SERPL-MCNC: 259 MG/DL — HIGH (ref 70–99)
HCT VFR BLD CALC: 35.6 % — SIGNIFICANT CHANGE UP (ref 34.5–45)
HGB BLD-MCNC: 11.8 G/DL — SIGNIFICANT CHANGE UP (ref 11.5–15.5)
IMM GRANULOCYTES NFR BLD AUTO: 0.2 % — SIGNIFICANT CHANGE UP (ref 0–1.5)
INR BLD: 0.99 RATIO — SIGNIFICANT CHANGE UP (ref 0.88–1.16)
LYMPHOCYTES # BLD AUTO: 2.82 K/UL — SIGNIFICANT CHANGE UP (ref 1–3.3)
LYMPHOCYTES # BLD AUTO: 23 % — SIGNIFICANT CHANGE UP (ref 13–44)
MCHC RBC-ENTMCNC: 28.2 PG — SIGNIFICANT CHANGE UP (ref 27–34)
MCHC RBC-ENTMCNC: 33.1 GM/DL — SIGNIFICANT CHANGE UP (ref 32–36)
MCV RBC AUTO: 85 FL — SIGNIFICANT CHANGE UP (ref 80–100)
MONOCYTES # BLD AUTO: 0.74 K/UL — SIGNIFICANT CHANGE UP (ref 0–0.9)
MONOCYTES NFR BLD AUTO: 6 % — SIGNIFICANT CHANGE UP (ref 2–14)
NEUTROPHILS # BLD AUTO: 8.15 K/UL — HIGH (ref 1.8–7.4)
NEUTROPHILS NFR BLD AUTO: 66.4 % — SIGNIFICANT CHANGE UP (ref 43–77)
PLATELET # BLD AUTO: 238 K/UL — SIGNIFICANT CHANGE UP (ref 150–400)
POTASSIUM SERPL-MCNC: 4.5 MMOL/L — SIGNIFICANT CHANGE UP (ref 3.5–5.3)
POTASSIUM SERPL-SCNC: 4.5 MMOL/L — SIGNIFICANT CHANGE UP (ref 3.5–5.3)
PROT SERPL-MCNC: 7.9 GM/DL — SIGNIFICANT CHANGE UP (ref 6–8.3)
PROTHROM AB SERPL-ACNC: 11 SEC — SIGNIFICANT CHANGE UP (ref 10–12.9)
RBC # BLD: 4.19 M/UL — SIGNIFICANT CHANGE UP (ref 3.8–5.2)
RBC # FLD: 12.5 % — SIGNIFICANT CHANGE UP (ref 10.3–14.5)
SODIUM SERPL-SCNC: 133 MMOL/L — LOW (ref 135–145)
WBC # BLD: 12.27 K/UL — HIGH (ref 3.8–10.5)
WBC # FLD AUTO: 12.27 K/UL — HIGH (ref 3.8–10.5)

## 2020-03-19 PROCEDURE — 36415 COLL VENOUS BLD VENIPUNCTURE: CPT

## 2020-03-19 PROCEDURE — 85610 PROTHROMBIN TIME: CPT

## 2020-03-19 PROCEDURE — 73630 X-RAY EXAM OF FOOT: CPT | Mod: RT

## 2020-03-19 PROCEDURE — 80053 COMPREHEN METABOLIC PANEL: CPT

## 2020-03-19 PROCEDURE — 99284 EMERGENCY DEPT VISIT MOD MDM: CPT | Mod: 25

## 2020-03-19 PROCEDURE — 96365 THER/PROPH/DIAG IV INF INIT: CPT

## 2020-03-19 PROCEDURE — 85025 COMPLETE CBC W/AUTO DIFF WBC: CPT

## 2020-03-19 PROCEDURE — 86140 C-REACTIVE PROTEIN: CPT

## 2020-03-19 PROCEDURE — 96375 TX/PRO/DX INJ NEW DRUG ADDON: CPT

## 2020-03-19 PROCEDURE — 73630 X-RAY EXAM OF FOOT: CPT | Mod: 26,RT

## 2020-03-19 PROCEDURE — 99285 EMERGENCY DEPT VISIT HI MDM: CPT

## 2020-03-19 PROCEDURE — 85652 RBC SED RATE AUTOMATED: CPT

## 2020-03-19 PROCEDURE — 93010 ELECTROCARDIOGRAM REPORT: CPT

## 2020-03-19 PROCEDURE — 87070 CULTURE OTHR SPECIMN AEROBIC: CPT

## 2020-03-19 PROCEDURE — 87186 SC STD MICRODIL/AGAR DIL: CPT

## 2020-03-19 PROCEDURE — 85730 THROMBOPLASTIN TIME PARTIAL: CPT

## 2020-03-19 PROCEDURE — 93005 ELECTROCARDIOGRAM TRACING: CPT

## 2020-03-19 PROCEDURE — 87040 BLOOD CULTURE FOR BACTERIA: CPT

## 2020-03-19 RX ORDER — AZTREONAM 2 G
1 VIAL (EA) INJECTION
Qty: 14 | Refills: 0
Start: 2020-03-19 | End: 2020-03-25

## 2020-03-19 RX ORDER — VANCOMYCIN HCL 1 G
1000 VIAL (EA) INTRAVENOUS ONCE
Refills: 0 | Status: COMPLETED | OUTPATIENT
Start: 2020-03-19 | End: 2020-03-19

## 2020-03-19 RX ORDER — CEFEPIME 1 G/1
2000 INJECTION, POWDER, FOR SOLUTION INTRAMUSCULAR; INTRAVENOUS ONCE
Refills: 0 | Status: COMPLETED | OUTPATIENT
Start: 2020-03-19 | End: 2020-03-19

## 2020-03-19 RX ADMIN — CEFEPIME 100 MILLIGRAM(S): 1 INJECTION, POWDER, FOR SOLUTION INTRAMUSCULAR; INTRAVENOUS at 18:22

## 2020-03-19 RX ADMIN — Medication 1000 MILLIGRAM(S): at 18:47

## 2020-03-19 RX ADMIN — Medication 250 MILLIGRAM(S): at 18:23

## 2020-03-19 RX ADMIN — Medication 1 TABLET(S): at 21:08

## 2020-03-19 NOTE — ED ADULT NURSE NOTE - NSIMPLEMENTINTERV_GEN_ALL_ED
Implemented All Universal Safety Interventions:  [place] to call system. Call bell, personal items and telephone within reach. Instruct patient to call for assistance. Room bathroom lighting operational. Non-slip footwear when patient is off stretcher. Physically safe environment: no spills, clutter or unnecessary equipment. Stretcher in lowest position, wheels locked, appropriate side rails in place.

## 2020-03-19 NOTE — ED ADULT NURSE NOTE - OBJECTIVE STATEMENT
ppt presents to ED via EMS, pt alert and orietnedx4, VSs afebrule, pt c/o right foot infection, with open wound and pain under foot. , pt denies fevers and states the wound developed 5 days ago. pt states she is a diabetic and takes insulin. pt has had infections and wounds with  surgery in past to same foot. safety maintained will continue to monitor

## 2020-03-19 NOTE — ED PROVIDER NOTE - SKIN, MLM
Skin warm, dry. No evidence of rash. Quarter sized ulceration to dorsal right foot with ulcerations to fourth and fifth web spaces.

## 2020-03-19 NOTE — ED PROVIDER NOTE - OBJECTIVE STATEMENT
59 y/o female with a PMHx of CAD with stents x2 on Plavix, DM, depression, HTN, HLD, presents to ED c/o ulceration to right foot x5 days with increasing pain. Pt unable to elaborate origin or injury. No systemic symptoms. Pt saw her podiatrist today, can not recall name of podiatrist. Pt was instructed to come to ED for evaluation. No other complaints at this time.

## 2020-03-19 NOTE — ED STATDOCS - PHYSICAL EXAMINATION
Gen:  Well appearning in NAD  Head:  NC/AT  Resp: No distress   Ext: no deformities  Skin: warm and dry as visualized

## 2020-03-19 NOTE — ED STATDOCS - NS_ ATTENDINGSCRIBEDETAILS _ED_A_ED_FT
I, Jacky Salter MD, performed the initial face to face bedside interview with this patient regarding history of present illness and determined that the patient should be seen in the main ED.  The history, was documented by the scribe in my presence and I attest to the accuracy of the documentation.

## 2020-03-19 NOTE — ED ADULT TRIAGE NOTE - CHIEF COMPLAINT QUOTE
Patient comes in with right foot infection. Patient was sent by urgent care. Patient has been in contact with daughter who has been in contact with confirmed COVID-19 case. Denies fevers, sob, chills.

## 2020-03-19 NOTE — ED STATDOCS - PROGRESS NOTE DETAILS
Mariam Grier for attending Dr. Salter: 61 y/o female with a PMHx of depression, neuropathy, HLD, HTN, stented coronary artery, DM Type II presents to the ED BIBEMS sent by urgent care presents with a non-healing wound on right foot. Pt has had fevers x2 days. Went to urgent care concern for osteomyelitis and possible need for amputation. Pt daughter is known COVID-19 exposure. Pt with no respiratory symptoms. NKDA. Will send pt to main ED for further evaluation. [facility]  #[id number]. I, Jacky Salter MD, performed the initial face to face bedside interview with this patient regarding history of present illness and determined that the patient should be seen in the main ED.  The history, was documented by the scribe in my presence and I attest to the accuracy of the documentation.

## 2020-03-19 NOTE — ED PROVIDER NOTE - ATTENDING CONTRIBUTION TO CARE
I, David Conley MD, personally saw the patient with the resident, and completed the key components of the history and physical exam. I then discussed the management plan with the resident.     Patinet with foot ulcer sent in by podiatry for eval. No fevers.  Plan: labs, XR, reassess

## 2020-03-19 NOTE — ED PROVIDER NOTE - NSFOLLOWUPINSTRUCTIONS_ED_ALL_ED_FT
Thank you for visiting our Emergency Department, it has been a pleasure taking part in your healthcare.    Your discharge diagnosis is: ulcer of foot  Please take all discharge medications as indicated below:  Bactrim DS twice a day x7 days  Please follow up with podiatry within x48 hours.  Please follow up with your PMD within x48 hours.  A copy of resulted labs, imaging, and findings have been provided to you.   You have had a detailed discussion with your provider regarding your diagnosis, care management and discharge planning including, but not limited to: return precautions, follow up visits with existing or new providers, new prescriptions and/or medication changes, wound and/or splint/cast care or other care   aspects specific to your diagnosis and treatment. You have been given the opportunity to have your questions answered. At this time you have been deemed stable and fit for discharge.  Return precautions to the Emergency Department include but are not limited to: unrelenting nausea, vomiting, fever, chills, chest pain, shortness of breath, dizziness, chest or abdominal pain, worsening back pain, syncope, blood in urine or stool, headache that doesn't resolve, numbness or tingling, loss of sensation, loss of motor function, or any other concerning symptoms.

## 2020-03-19 NOTE — ED PROVIDER NOTE - CLINICAL SUMMARY MEDICAL DECISION MAKING FREE TEXT BOX
61 y/o female diabetic, presents with ulceration to right foot. Concern for infection vs osteo. Plan: labs, XR, IV abx, discuss with podiatry, likely admit.

## 2020-03-19 NOTE — ED PROVIDER NOTE - CCCP TRG CHIEF CMPLNT
[General Appearance - Well Nourished] : well nourished [General Appearance - Well Developed] : well developed [Sclera] : the sclera and conjunctiva were normal [Neck Appearance] : the appearance of the neck was normal [Oropharynx] : the oropharynx was normal [Respiration, Rhythm And Depth] : normal respiratory rhythm and effort [Auscultation Breath Sounds / Voice Sounds] : lungs were clear to auscultation bilaterally [Heart Sounds] : normal S1 and S2 [Murmurs] : no murmurs [Edema] : there was no peripheral edema [Full Pulse] : the pedal pulses are present [Abdomen Tenderness] : non-tender [] : no hepato-splenomegaly [Cervical Lymph Nodes Enlarged Anterior Bilaterally] : anterior cervical [Supraclavicular Lymph Nodes Enlarged Bilaterally] : supraclavicular [No Spinal Tenderness] : no spinal tenderness [Motor Tone] : muscle strength and tone were normal [Abnormal Walk] : normal gait [Musculoskeletal - Swelling] : no joint swelling seen [Skin Lesions] : no skin lesions [Deep Tendon Reflexes (DTR)] : deep tendon reflexes were 2+ and symmetric [Motor Exam] : the motor exam was normal [Oriented To Time, Place, And Person] : oriented to person, place, and time [FreeTextEntry1] : + malar rash foot pain/injury

## 2020-03-19 NOTE — ED PROVIDER NOTE - PROGRESS NOTE DETAILS
Mariam Moss for attending Dr. Conley: Spoke to podiatry. Will come to ED and evaluate pt. Dmitry PGY3: discussed with podiatry recommend outpt mgmt with bactrim x7 days, discussed with family and pt agree with plan for dc, reconfirmed with daughter that daughter got COVID testing today is pending, is w/o URI sx, mother (pt) has had no fever or URI sx. Will d/c with PMD f/u, strict return precautions given with read back per pt/family/caregiver.

## 2020-03-19 NOTE — ED PROVIDER NOTE - PATIENT PORTAL LINK FT
You can access the FollowMyHealth Patient Portal offered by [facility] by registering at the following website: [email or web address]. By joining [employer]’s FollowMyHealth portal, you will also be able to view your health information using other applications (apps) compatible with our system.

## 2020-03-20 ENCOUNTER — TRANSCRIPTION ENCOUNTER (OUTPATIENT)
Age: 60
End: 2020-03-20

## 2020-03-20 LAB — CRP SERPL-MCNC: 7.69 MG/DL — HIGH (ref 0–0.4)

## 2020-03-21 LAB
-  AMPICILLIN/SULBACTAM: SIGNIFICANT CHANGE UP
-  CEFAZOLIN: SIGNIFICANT CHANGE UP
-  CLINDAMYCIN: SIGNIFICANT CHANGE UP
-  ERYTHROMYCIN: SIGNIFICANT CHANGE UP
-  GENTAMICIN: SIGNIFICANT CHANGE UP
-  OXACILLIN: SIGNIFICANT CHANGE UP
-  RIFAMPIN: SIGNIFICANT CHANGE UP
-  TETRACYCLINE: SIGNIFICANT CHANGE UP
-  TRIMETHOPRIM/SULFAMETHOXAZOLE: SIGNIFICANT CHANGE UP
-  VANCOMYCIN: SIGNIFICANT CHANGE UP
METHOD TYPE: SIGNIFICANT CHANGE UP

## 2020-03-24 LAB
CULTURE RESULTS: SIGNIFICANT CHANGE UP
ORGANISM # SPEC MICROSCOPIC CNT: SIGNIFICANT CHANGE UP
ORGANISM # SPEC MICROSCOPIC CNT: SIGNIFICANT CHANGE UP
SPECIMEN SOURCE: SIGNIFICANT CHANGE UP

## 2020-03-25 LAB
CULTURE RESULTS: SIGNIFICANT CHANGE UP
SPECIMEN SOURCE: SIGNIFICANT CHANGE UP

## 2020-04-09 DIAGNOSIS — Z71.89 OTHER SPECIFIED COUNSELING: ICD-10-CM

## 2020-08-10 ENCOUNTER — EMERGENCY (EMERGENCY)
Facility: HOSPITAL | Age: 60
LOS: 0 days | Discharge: HOME | End: 2020-08-10
Attending: EMERGENCY MEDICINE
Payer: MEDICAID

## 2020-08-10 VITALS — HEIGHT: 62 IN | WEIGHT: 149.91 LBS

## 2020-08-10 VITALS
OXYGEN SATURATION: 97 % | RESPIRATION RATE: 16 BRPM | DIASTOLIC BLOOD PRESSURE: 85 MMHG | SYSTOLIC BLOOD PRESSURE: 199 MMHG | HEART RATE: 67 BPM | TEMPERATURE: 98 F

## 2020-08-10 DIAGNOSIS — Z95.5 PRESENCE OF CORONARY ANGIOPLASTY IMPLANT AND GRAFT: ICD-10-CM

## 2020-08-10 DIAGNOSIS — E11.65 TYPE 2 DIABETES MELLITUS WITH HYPERGLYCEMIA: ICD-10-CM

## 2020-08-10 DIAGNOSIS — I10 ESSENTIAL (PRIMARY) HYPERTENSION: ICD-10-CM

## 2020-08-10 DIAGNOSIS — Z79.4 LONG TERM (CURRENT) USE OF INSULIN: ICD-10-CM

## 2020-08-10 DIAGNOSIS — Z95.5 PRESENCE OF CORONARY ANGIOPLASTY IMPLANT AND GRAFT: Chronic | ICD-10-CM

## 2020-08-10 DIAGNOSIS — E11.40 TYPE 2 DIABETES MELLITUS WITH DIABETIC NEUROPATHY, UNSPECIFIED: ICD-10-CM

## 2020-08-10 DIAGNOSIS — E78.5 HYPERLIPIDEMIA, UNSPECIFIED: ICD-10-CM

## 2020-08-10 DIAGNOSIS — F32.9 MAJOR DEPRESSIVE DISORDER, SINGLE EPISODE, UNSPECIFIED: ICD-10-CM

## 2020-08-10 LAB
ALBUMIN SERPL ELPH-MCNC: 3.4 G/DL — SIGNIFICANT CHANGE UP (ref 3.3–5)
ALP SERPL-CCNC: 151 U/L — HIGH (ref 40–120)
ALT FLD-CCNC: 96 U/L — HIGH (ref 12–78)
ANION GAP SERPL CALC-SCNC: 7 MMOL/L — SIGNIFICANT CHANGE UP (ref 5–17)
APPEARANCE UR: CLEAR — SIGNIFICANT CHANGE UP
AST SERPL-CCNC: 67 U/L — HIGH (ref 15–37)
BASOPHILS # BLD AUTO: 0.06 K/UL — SIGNIFICANT CHANGE UP (ref 0–0.2)
BASOPHILS NFR BLD AUTO: 1.2 % — SIGNIFICANT CHANGE UP (ref 0–2)
BILIRUB SERPL-MCNC: 0.5 MG/DL — SIGNIFICANT CHANGE UP (ref 0.2–1.2)
BILIRUB UR-MCNC: NEGATIVE — SIGNIFICANT CHANGE UP
BUN SERPL-MCNC: 14 MG/DL — SIGNIFICANT CHANGE UP (ref 7–23)
CALCIUM SERPL-MCNC: 9.1 MG/DL — SIGNIFICANT CHANGE UP (ref 8.5–10.1)
CHLORIDE SERPL-SCNC: 101 MMOL/L — SIGNIFICANT CHANGE UP (ref 96–108)
CO2 SERPL-SCNC: 27 MMOL/L — SIGNIFICANT CHANGE UP (ref 22–31)
COLOR SPEC: YELLOW — SIGNIFICANT CHANGE UP
CREAT SERPL-MCNC: 0.9 MG/DL — SIGNIFICANT CHANGE UP (ref 0.5–1.3)
DIFF PNL FLD: ABNORMAL
EOSINOPHIL # BLD AUTO: 0.24 K/UL — SIGNIFICANT CHANGE UP (ref 0–0.5)
EOSINOPHIL NFR BLD AUTO: 4.8 % — SIGNIFICANT CHANGE UP (ref 0–6)
GLUCOSE SERPL-MCNC: 333 MG/DL — HIGH (ref 70–99)
GLUCOSE UR QL: 1000 MG/DL
HCT VFR BLD CALC: 35 % — SIGNIFICANT CHANGE UP (ref 34.5–45)
HGB BLD-MCNC: 11.5 G/DL — SIGNIFICANT CHANGE UP (ref 11.5–15.5)
IMM GRANULOCYTES NFR BLD AUTO: 0.4 % — SIGNIFICANT CHANGE UP (ref 0–1.5)
KETONES UR-MCNC: NEGATIVE — SIGNIFICANT CHANGE UP
LEUKOCYTE ESTERASE UR-ACNC: ABNORMAL
LIDOCAIN IGE QN: 195 U/L — SIGNIFICANT CHANGE UP (ref 73–393)
LYMPHOCYTES # BLD AUTO: 2.16 K/UL — SIGNIFICANT CHANGE UP (ref 1–3.3)
LYMPHOCYTES # BLD AUTO: 43 % — SIGNIFICANT CHANGE UP (ref 13–44)
MCHC RBC-ENTMCNC: 28 PG — SIGNIFICANT CHANGE UP (ref 27–34)
MCHC RBC-ENTMCNC: 32.9 GM/DL — SIGNIFICANT CHANGE UP (ref 32–36)
MCV RBC AUTO: 85.2 FL — SIGNIFICANT CHANGE UP (ref 80–100)
MONOCYTES # BLD AUTO: 0.36 K/UL — SIGNIFICANT CHANGE UP (ref 0–0.9)
MONOCYTES NFR BLD AUTO: 7.2 % — SIGNIFICANT CHANGE UP (ref 2–14)
NEUTROPHILS # BLD AUTO: 2.18 K/UL — SIGNIFICANT CHANGE UP (ref 1.8–7.4)
NEUTROPHILS NFR BLD AUTO: 43.4 % — SIGNIFICANT CHANGE UP (ref 43–77)
NITRITE UR-MCNC: NEGATIVE — SIGNIFICANT CHANGE UP
PH UR: 7 — SIGNIFICANT CHANGE UP (ref 5–8)
PLATELET # BLD AUTO: 226 K/UL — SIGNIFICANT CHANGE UP (ref 150–400)
POTASSIUM SERPL-MCNC: 5.3 MMOL/L — SIGNIFICANT CHANGE UP (ref 3.5–5.3)
POTASSIUM SERPL-SCNC: 5.3 MMOL/L — SIGNIFICANT CHANGE UP (ref 3.5–5.3)
PROT SERPL-MCNC: 7.3 GM/DL — SIGNIFICANT CHANGE UP (ref 6–8.3)
PROT UR-MCNC: 500 MG/DL
RBC # BLD: 4.11 M/UL — SIGNIFICANT CHANGE UP (ref 3.8–5.2)
RBC # FLD: 12.5 % — SIGNIFICANT CHANGE UP (ref 10.3–14.5)
SODIUM SERPL-SCNC: 135 MMOL/L — SIGNIFICANT CHANGE UP (ref 135–145)
SP GR SPEC: 1.01 — SIGNIFICANT CHANGE UP (ref 1.01–1.02)
UROBILINOGEN FLD QL: NEGATIVE MG/DL — SIGNIFICANT CHANGE UP
WBC # BLD: 5.02 K/UL — SIGNIFICANT CHANGE UP (ref 3.8–10.5)
WBC # FLD AUTO: 5.02 K/UL — SIGNIFICANT CHANGE UP (ref 3.8–10.5)

## 2020-08-10 PROCEDURE — 36415 COLL VENOUS BLD VENIPUNCTURE: CPT

## 2020-08-10 PROCEDURE — 99284 EMERGENCY DEPT VISIT MOD MDM: CPT

## 2020-08-10 PROCEDURE — 83690 ASSAY OF LIPASE: CPT

## 2020-08-10 PROCEDURE — 82962 GLUCOSE BLOOD TEST: CPT

## 2020-08-10 PROCEDURE — 99283 EMERGENCY DEPT VISIT LOW MDM: CPT

## 2020-08-10 PROCEDURE — 85025 COMPLETE CBC W/AUTO DIFF WBC: CPT

## 2020-08-10 PROCEDURE — 80053 COMPREHEN METABOLIC PANEL: CPT

## 2020-08-10 PROCEDURE — 81001 URINALYSIS AUTO W/SCOPE: CPT

## 2020-08-10 RX ORDER — SODIUM CHLORIDE 9 MG/ML
1000 INJECTION INTRAMUSCULAR; INTRAVENOUS; SUBCUTANEOUS ONCE
Refills: 0 | Status: DISCONTINUED | OUTPATIENT
Start: 2020-08-10 | End: 2020-08-10

## 2020-08-10 RX ORDER — SODIUM CHLORIDE 9 MG/ML
1500 INJECTION INTRAMUSCULAR; INTRAVENOUS; SUBCUTANEOUS ONCE
Refills: 0 | Status: COMPLETED | OUTPATIENT
Start: 2020-08-10 | End: 2020-08-10

## 2020-08-10 RX ORDER — INSULIN HUMAN 100 [IU]/ML
4 INJECTION, SOLUTION SUBCUTANEOUS ONCE
Refills: 0 | Status: COMPLETED | OUTPATIENT
Start: 2020-08-10 | End: 2020-08-10

## 2020-08-10 RX ADMIN — SODIUM CHLORIDE 1500 MILLILITER(S): 9 INJECTION INTRAMUSCULAR; INTRAVENOUS; SUBCUTANEOUS at 16:30

## 2020-08-10 RX ADMIN — INSULIN HUMAN 4 UNIT(S): 100 INJECTION, SOLUTION SUBCUTANEOUS at 16:30

## 2020-08-10 NOTE — ED STATDOCS - NSFOLLOWUPINSTRUCTIONS_ED_ALL_ED_FT
Follow with your primary doctor and endocrinologist for further evaluation of your diabetes and to make sure your insulin is the right amount to control your sugar.  Refrain from sugar, soda, carbohydrates, bread which can increase the sugar.     Return to the ER for any new or other concerns.

## 2020-08-10 NOTE — ED STATDOCS - NSFOLLOWUPCLINICS_GEN_ALL_ED_FT
[facility] Endocrinology  Endocrinology  [address] [place] [address]  Phone: [phone]  Fax:   Follow Up Time:

## 2020-08-10 NOTE — ED STATDOCS - CLINICAL SUMMARY MEDICAL DECISION MAKING FREE TEXT BOX
Labs, fluids, will speak with PMD and may require IV insulin. Pt with chronically elevated hemoglobin a1c.

## 2020-08-10 NOTE — ED STATDOCS - OBJECTIVE STATEMENT
61 y/o female with a PMHx of depression, neuropathy, HLD, HTN, DM on insulin, s/p coronary artery sent placement x2, presents to the ED sent by PCP for elevated glucose levels. Pt also c/o dizziness and b/l leg pain. Denies fever, vomiting. No other complaints at this time. On Metoprolol, Gabapentin. On blood thinners. NKDA.

## 2020-08-10 NOTE — ED ADULT NURSE REASSESSMENT NOTE - NS ED NURSE REASSESS COMMENT FT1
High blood pressure Jerson PURDY aware no additional order received . pt didn't take her BP medication today will take when arriving home.

## 2020-08-10 NOTE — ED STATDOCS - PATIENT PORTAL LINK FT
You can access the FollowMyHealth Patient Portal offered by [facility] by registering at the following website: [email or web address]. By joining [address]’s FollowMyHealth portal, you will also be able to view your health information using other applications (apps) compatible with our system.

## 2020-08-10 NOTE — ED STATDOCS - PROGRESS NOTE DETAILS
61 yo female was sent by [facility] for worsening blood glucose. Pt currently on insulin and glipizide. States she feels weak and tired and leg pain. Will check labs, UA, IVF, and insulin depending on FS. Tried to call her pmd  Glenis Goddard from [facility], [phone] but did not  and could not leave .  -Jerson Cisneros PA-C Labs WNL except slightly elevated glucose which she was given insulin and IVF for. Will recheck FS after meds and IVF given and d/c home with endocrinology referral. Repeat .  -Jerson Cisneros PA-C

## 2021-02-26 ENCOUNTER — EMERGENCY (EMERGENCY)
Facility: HOSPITAL | Age: 61
LOS: 0 days | Discharge: HOME | End: 2021-02-26
Attending: EMERGENCY MEDICINE
Payer: MEDICAID

## 2021-02-26 VITALS
DIASTOLIC BLOOD PRESSURE: 127 MMHG | TEMPERATURE: 99 F | HEIGHT: 62 IN | HEART RATE: 77 BPM | WEIGHT: 179.9 LBS | SYSTOLIC BLOOD PRESSURE: 155 MMHG | OXYGEN SATURATION: 97 % | RESPIRATION RATE: 24 BRPM

## 2021-02-26 DIAGNOSIS — E11.42 TYPE 2 DIABETES MELLITUS WITH DIABETIC POLYNEUROPATHY: ICD-10-CM

## 2021-02-26 DIAGNOSIS — M79.672 PAIN IN LEFT FOOT: ICD-10-CM

## 2021-02-26 DIAGNOSIS — M79.671 PAIN IN RIGHT FOOT: ICD-10-CM

## 2021-02-26 DIAGNOSIS — Z79.02 LONG TERM (CURRENT) USE OF ANTITHROMBOTICS/ANTIPLATELETS: ICD-10-CM

## 2021-02-26 DIAGNOSIS — Z95.5 PRESENCE OF CORONARY ANGIOPLASTY IMPLANT AND GRAFT: ICD-10-CM

## 2021-02-26 DIAGNOSIS — Z79.899 OTHER LONG TERM (CURRENT) DRUG THERAPY: ICD-10-CM

## 2021-02-26 DIAGNOSIS — I25.10 ATHEROSCLEROTIC HEART DISEASE OF NATIVE CORONARY ARTERY WITHOUT ANGINA PECTORIS: ICD-10-CM

## 2021-02-26 DIAGNOSIS — Z95.5 PRESENCE OF CORONARY ANGIOPLASTY IMPLANT AND GRAFT: Chronic | ICD-10-CM

## 2021-02-26 DIAGNOSIS — I10 ESSENTIAL (PRIMARY) HYPERTENSION: ICD-10-CM

## 2021-02-26 DIAGNOSIS — E78.5 HYPERLIPIDEMIA, UNSPECIFIED: ICD-10-CM

## 2021-02-26 DIAGNOSIS — F32.9 MAJOR DEPRESSIVE DISORDER, SINGLE EPISODE, UNSPECIFIED: ICD-10-CM

## 2021-02-26 DIAGNOSIS — Z79.82 LONG TERM (CURRENT) USE OF ASPIRIN: ICD-10-CM

## 2021-02-26 DIAGNOSIS — Z79.4 LONG TERM (CURRENT) USE OF INSULIN: ICD-10-CM

## 2021-02-26 PROCEDURE — 99283 EMERGENCY DEPT VISIT LOW MDM: CPT

## 2021-02-26 PROCEDURE — 73630 X-RAY EXAM OF FOOT: CPT | Mod: RT

## 2021-02-26 PROCEDURE — 99283 EMERGENCY DEPT VISIT LOW MDM: CPT | Mod: 25

## 2021-02-26 PROCEDURE — 73630 X-RAY EXAM OF FOOT: CPT | Mod: 26,RT

## 2021-02-26 NOTE — ED STATDOCS - NSFOLLOWUPINSTRUCTIONS_ED_ALL_ED_FT
PERIPHERAL NEUROPATHY - General Information           Peripheral Neuropathy    WHAT YOU NEED TO KNOW:    What is peripheral neuropathy? Peripheral neuropathy is a condition that affects nerves in your arms, legs, hands, or feet. It also may affect your organs, such as your lungs, stomach, bladder, or genitals. Peripheral neuropathy can affect the nerves that allow you to move or to feel objects. It also may affect nerves that control your body functions, such as digestion or urination. This condition may go away on its own, or you may always have it.    What causes peripheral neuropathy?   •Any accident that causes nerve damage      •A cast or a splint that puts pressure on and pinches nerves      •Repeated movements, such as typing      •Alcohol abuse      •An infection such as herpes or Lyme disease      •Health conditions such as rheumatoid arthritis, cancer, or lupus      •Certain medical treatments, such as chemotherapy or surgery      •Some medicines for heart conditions or HIV      What are the signs and symptoms of peripheral neuropathy?   •Pain or tingling in your legs, feet, arms, or hands that may feel sharp, stabbing, or burning      •Trouble walking or keeping your balance      •Weakness or difficulty holding things      •Loss of your sense of touch or numbness      •Bruising easily      •Trouble controlling your bladder or bowels or having sex      How is peripheral neuropathy diagnosed? Your healthcare provider will examine you and ask about your symptoms. He or she may ask you about your other health conditions and about your family health history. Your provider may touch your skin in different areas with a cotton ball or a pin to check your sense of touch. He or she may also check how well you can feel hot and cold. Your provider will ask you to do simple movements. For example, he or she may ask you to walk or to move your fingers. You may also need any of the following:  •Blood tests may be used to check for conditions that may be causing your peripheral neuropathy.      •An electromyography (EMG) test measures the electrical activity of your muscles at rest and with movement.      •Nerve conduction studies test how your nerves respond to stimulation. Electrodes (wires) are placed on affected areas of your body. They send electrical currents into the nerve to see how quickly it responds.      •A nerve biopsy is used to take a sample of nerves to be tested.      How is peripheral neuropathy treated? Treatment may help relieve your pain and help you function in your daily activities. Treatment of the condition causing the peripheral neuropathy may improve your symptoms. You may need the following:  •Medicines may be given to help decrease nerve pain.      •Physical and occupational therapists may help you exercise your arms, legs, and hands. They may teach you new ways to do things at home.      •Transcutaneous electrical nerve stimulation (TENS) stimulates your nerves and may decrease your pain. Wires are attached to pads. The pads are attached to your skin. The wires send a mild current through your nerves. Do not have TENS if you have a pacemaker or are pregnant.      •A brace or splint helps support or hold an affected area still.      How can I manage peripheral neuropathy?   •Prevent falls. Move with care and stand up slowly. Wear shoes that support your feet, and do not go barefoot. Ask about walking aids, such as a cane or walker. You may want to install railings or nonslip pads in your home, especially in the bathroom. Ask for more information on how to avoid falls.  Fall Prevention for Adults           •Check your skin daily. Sores can form where your skin makes contact with chairs, beds, or other body parts. They also can form under splints. Keep your skin clean, and check your skin daily for sores.      •Exercise as directed. Ask your healthcare provider about the best exercise plan for you. Physical activity may increase your balance and strength and may decrease your pain. It is best to start exercising slowly and do more as you get stronger.  [employer] Walking for Exercise           Call your local emergency number (911 in the [place]) if:   •You cannot walk at all.          When should I seek immediate care?   •You fall.          When should I call my doctor?   •Your pain is severe.      •You cannot control your bladder.      •You have trouble having sex.      •You have questions or concerns about your condition or care.      CARE AGREEMENT:    You have the right to help plan your care. Learn about your health condition and how it may be treated. Discuss treatment options with your healthcare providers to decide what care you want to receive. You always have the right to refuse treatment.     Rest. Elevate affected feet.  Increase Gabapentin to 3 tabs daily.   Follow up with Podiatrist.

## 2021-02-26 NOTE — ED STATDOCS - CLINICAL SUMMARY MEDICAL DECISION MAKING FREE TEXT BOX
59 y/o F with hx of diabetic neuropathy on insulin, has been taking 2 pills of gabapentin likely needs 3. Reports she has been unable to f/u with podiatrist 2/2 insurance issues with prior podiatrist. Will obtain XR, told increase gabapentin to 3 pills, f/u with PCP and endocrinologist, and provide podiatrist f/u.

## 2021-02-26 NOTE — ED STATDOCS - ATTENDING CONTRIBUTION TO CARE
I, Lillian Graham MD, performed the initial face to face bedside interview with this patient regarding history of present illness, review of symptoms and relevant past medical, social and family history.  I completed an independent physical examination.  I was the initial provider who evaluated this patient. I have signed out the follow up of any pending tests (i.e. labs, radiological studies) to the ACP.  I have communicated the patient’s plan of care and disposition with the ACP.  The history, relevant review of systems, past medical and surgical history, medical decision making, and physical examination was documented by the scribe in my presence and I attest to the accuracy of the documentation.

## 2021-02-26 NOTE — ED STATDOCS - PATIENT PORTAL LINK FT
You can access the FollowMyHealth Patient Portal offered by [facility] by registering at the following website: [email or web address]. By joining [email or web address]’s FollowMyHealth portal, you will also be able to view your health information using other applications (apps) compatible with our system.

## 2021-02-26 NOTE — ED ADULT NURSE NOTE - OBJECTIVE STATEMENT
59 y/o female with a PMHx of CAD s/p coronary artery stent placement x2, HTN, HLD, DM with neuropathy, depression, and R foot fracture 3 years ago presents to the ED c/o +b/l foot pain x2 months. Reports pain is worse at night. No fever or chills. Taking 2 gabapentin daily. NKDA. PCP: Dr. Glenis Goddard.

## 2021-02-26 NOTE — ED STATDOCS - OBJECTIVE STATEMENT
61 y/o female with a PMHx of CAD s/p coronary artery stent placement x2, HTN, HLD, DM with neuropathy, depression, and R foot fracture 3 years ago presents to the ED c/o +b/l foot pain x2 months. Reports pain is worse at night. No fever or chills. Taking 2 gabapentin daily. NKDA. PCP: Dr. Glenis Goddard.

## 2021-02-26 NOTE — ED STATDOCS - CARE PROVIDER_API CALL
Fatou Glover  PODIATRIC MEDICINE AND SURGERY  [address], [address]  [place] [address]  Phone: [phone]  Fax: [phone]  Follow Up Time:     Yunior White  PODIATRIC MEDICINE AND SURGERY  [address] [place] [address]  Phone: [phone]  Fax: [phone]  Follow Up Time:

## 2022-03-16 NOTE — ED ADULT NURSE NOTE - BREATHING, MLM
Christiano Dunn - Neurosurgery ([facility])  Vascular Neurology  Comprehensive Stroke Center  Progress Note    Assessment/Plan:     Transient neurological symptoms  Samaria Verma is a 49 y.o. female with PMHx of CHF (AICD), LBBB, cardiomyopathy, DM, and HTN who presented with chest pain, blurry vision, left sided numbness, and double vision. Stroke code called. Blurry vision is chronic and has not sought out care for this issue. Reports medication noncompliance due to financial hardship and does not have a PCP.   Stroke provider who initially evaluated patient in ED noted evidence of functional exam findings. Patient reported that she could not move left arm, however on distraction patient moved hand and was able to maintain arm above her head with out dropping it. Noted increased tone on the left arm, mild left facial weakness, and fluctuating visual fields defect. Patient was noted to be hyperglycemic with blood glucose >600 and hyponatremic.CTA MP negative for LVO, high grade stenosis, vascular malformation, and hemorrhage. Patient has a pacemaker in which she was told it was not MRI compatible.     She was not a candidate for TPA as there was stroke evidence of stroke mimic with blood glucose >600 and functional exam. She was admitted to hospital medicine for HHS. Exam improved with improvement in blood glucose. There is low suspicion for cerebrovascular origin for patient's symptoms. Stroke team will follow up on repeat CT.           Mixed hyperlipidemia  Stroke risk factor  Elevated lipid panel  Patient would benefit from statin therapy      Hyponatremia  Na 131 on arrival       Class 1 obesity in adult  Stroke RF    on diet and exercise when appropriate    Chronic systolic congestive heart failure  Last TTE in 2016 with EF 40%, mild LAE,mild SHIVA, hypokinetic septum and anterior wall   Repeat TTE with EF of 25% and severe LV global hypokinesis  Continue coreg and losartan      Primary hypertension  Stroke RF   bp  180s on arrival  Low suspicion for acute infarct, goal SBP <140    Diabetes mellitus type 2 in obese  Stroke RF   A1C >14  Glucose on arrival 646   Recommend endocrinology consult            03/16/2022 patient admitted to  for HHS, neurologic symptoms from yesterday improved with improvement of blood glucose, low suspicion for cerebrovascular origin for patient's symptoms. Will follow up on repeat CT      STROKE DOCUMENTATION   Acute Stroke Times   Last Known Normal Date: 03/15/22  Last Known Normal Time: 1530  Symptom Onset Date: 03/15/22  Symptom Onset Time: 1530  Stroke Team Called Date: 03/15/22  Stroke Team Called Time: 1637  Stroke Team Arrival Date: 03/15/22  Stroke Team Arrival Time: 1642  CT completed but images not available for review - spoke to document results: 1647  Alteplase Recommended: No  CTA Interpretation Time: 1648  Thrombectomy Recommended: No    NIH Scale:  1a. Level of Consciousness: 0-->Alert, keenly responsive  1b. LOC Questions: 0-->Answers both questions correctly  1c. LOC Commands: 0-->Performs both tasks correctly  2. Best Gaze: 0-->Normal  3. Visual: 0-->No visual loss  4. Facial Palsy: 0-->Normal symmetrical movements  5a. Motor Arm, Left: 0-->No drift, limb holds 90 (or 45) degrees for full 10 secs  5b. Motor Arm, Right: 0-->No drift, limb holds 90 (or 45) degrees for full 10 secs  6a. Motor Leg, Left: 0-->No drift, leg holds 30 degree position for full 5 secs  6b. Motor Leg, Right: 0-->No drift, leg holds 30 degree position for full 5 secs  7. Limb Ataxia: 0-->Absent  8. Sensory: 0-->Normal, no sensory loss  9. Best Language: 0-->No aphasia, normal  10. Dysarthria: 0-->Normal  11. Extinction and Inattention (formerly Neglect): 0-->No abnormality  Total (NIH Stroke Scale): 0       Modified Hughes Score: 1  [place] Coma Scale:    ABCD2 Score:    [id number] Score:   HAS -BLED Score:   ICH Score:   Hunt & Pinedo Classification:      Hemorrhagic change of an Ischemic Stroke: Does this  patient have an ischemic stroke with hemorrhagic changes? No     Neurologic Chief Complaint: blurry vision, diplopia, L sided numbness, L sided weakness    Subjective:     Interval History: Patient is seen for follow-up neurological assessment and treatment recommendations: patient admitted to  for HHS, neurologic symptoms from yesterday improved with improvement of blood glucose, low suspicion for cerebrovascular origin for patient's symptoms. Will follow up on repeat CT    HPI, Past Medical, Family, and Social History remains the same as documented in the initial encounter.     Review of Systems   Constitutional:  Negative for fever.   HENT:  Negative for drooling and trouble swallowing.    Eyes:  Positive for visual disturbance.   Respiratory:  Negative for cough.    Cardiovascular:  Negative for chest pain.   Gastrointestinal:  Negative for nausea and vomiting.   Genitourinary:  Negative for dysuria.   Musculoskeletal:  Negative for arthralgias and myalgias.   Skin:  Negative for rash.   Neurological:  Negative for facial asymmetry, speech difficulty, weakness and numbness.   Psychiatric/Behavioral:  Negative for agitation.    Scheduled Meds:   aspirin  81 mg Oral Daily    atorvastatin  40 mg Oral Daily    carvediloL  6.25 mg Oral BID    heparin (porcine)  5,000 Units Subcutaneous Q8H    insulin aspart U-100  6 Units Subcutaneous TIDWM    insulin detemir U-100  10 Units Subcutaneous QHS    losartan  12.5 mg Oral Daily    polyethylene glycol  17 g Oral Daily    potassium bicarbonate  50 mEq Oral BID AC     Continuous Infusions:  PRN Meds:acetaminophen, albuterol-ipratropium, aluminum-magnesium hydroxide-simethicone, dextrose 10%, dextrose 10%, glucagon (human recombinant), glucose, glucose, hydrALAZINE, hydrOXYzine HCL, melatonin, naloxone, ondansetron, sodium chloride 0.9%    Objective:     Vital Signs (Most Recent):  Temp: 98.6 °F (37 °C) (03/16/22 1517)  Pulse: 89 (03/16/22 1517)  Resp: 17 (03/16/22  1517)  BP: (!) 147/88 (03/16/22 1517)  SpO2: 99 % (03/16/22 1517)  BP Location: Left arm    Vital Signs Range (Last 24H):  Temp:  [97.4 °F (36.3 °C)-99.3 °F (37.4 °C)]   Pulse:  []   Resp:  [14-23]   BP: (131-182)/(67-88)   SpO2:  [90 %-99 %]   BP Location: Left arm    Physical Exam  Vitals reviewed.   Constitutional:       General: She is not in acute distress.     Appearance: She is well-developed.   HENT:      Head: Normocephalic and atraumatic.      Right Ear: External ear normal.      Left Ear: External ear normal.      Nose: Nose normal.   Eyes:      General: No scleral icterus.     Pupils: Pupils are equal, round, and reactive to light.   Cardiovascular:      Rate and Rhythm: Normal rate.   Pulmonary:      Effort: Pulmonary effort is normal. No respiratory distress.   Musculoskeletal:         General: Normal range of motion.      Cervical back: Normal range of motion.      Right lower leg: No edema.      Left lower leg: No edema.   Skin:     General: Skin is warm and dry.   Neurological:      Mental Status: She is alert.   Psychiatric:         Mood and Affect: Mood normal.         Behavior: Behavior normal.       Neurological Exam:   LOC: alert  Attention Span: Good   Language: No aphasia  Articulation: No dysarthria  Orientation: Person, Place, Time   Visual Fields: Full  EOM (CN III, IV, VI): Full/intact  Facial Movement (CN VII): Symmetric facial expression    Motor: Arm left  Normal 5/5  Leg left  Normal 5/5  Arm right  Normal 5/5  Leg right Normal 5/5  Cerebellum: No evidence of appendicular or axial ataxia  Sensation: Intact to light touch, temperature and vibration  Tone: Normal tone throughout    Laboratory:  CMP:   Recent Labs   Lab 03/16/22  0712   CALCIUM 5.8*   ALBUMIN 2.0*   PROT 4.1*      K 2.3*   CO2 16*   *   BUN 7   CREATININE 0.4*   ALKPHOS 60   ALT 18   AST 13   BILITOT 0.2     CBC:   Recent Labs   Lab 03/16/22  0839   WBC 9.05   RBC 4.55   HGB 11.4*   HCT 36.9*       MCV 81*   MCH 25.1*   MCHC 30.9*     Lipid Panel: No results for input(s): CHOL, LDLCALC, HDL, TRIG in the last 168 hours.  Coagulation: No results for input(s): PT, INR, APTT in the last 168 hours.  Platelet Aggregation Study: No results for input(s): PLTAGG, PLTAGINTERP, PLTAGREGLACO, ADPPLTAGGREG in the last 168 hours.  Hgb A1C:   Recent Labs   Lab 03/15/22  1756   HGBA1C >14.0*     TSH: No results for input(s): TSH in the last 168 hours.    Diagnostic Results     Brain Imaging   Repeat CT pending    Vessel Imaging   CTA MP 3/15/22   No acute intracranial process.  Chronic small right cerebellar infarct.     No significant stenosis at the carotid bifurcations by NASCET criteria.  The vertebral arteries are patent without advanced stenosis.     No major branch stenosis/occlusion intracranially.        Cardiac Imaging   TTE 3/16/22  · The left ventricle is normal in size with severely decreased systolic function. The estimated ejection fraction is 25%.  · There is severe left ventricular global hypokinesis.  · Normal right ventricular size with normal right ventricular systolic function.  · Left ventricular diastolic dysfunction.  · Mild left atrial enlargement.  · Normal central venous pressure (3 mmHg).         Archana Car PA-C  Comprehensive Stroke Center  Department of Vascular Neurology   [facility] Neurosurgery [facility])       Spontaneous, unlabored and symmetrical

## 2022-09-15 ENCOUNTER — INPATIENT (INPATIENT)
Facility: HOSPITAL | Age: 62
LOS: 3 days | Discharge: HOME | DRG: 46 | End: 2022-09-19
Attending: HOSPITALIST | Admitting: INTERNAL MEDICINE
Payer: MEDICAID

## 2022-09-15 VITALS — WEIGHT: 149.91 LBS | HEIGHT: 62 IN

## 2022-09-15 DIAGNOSIS — Z95.5 PRESENCE OF CORONARY ANGIOPLASTY IMPLANT AND GRAFT: Chronic | ICD-10-CM

## 2022-09-15 DIAGNOSIS — R55 SYNCOPE AND COLLAPSE: ICD-10-CM

## 2022-09-15 LAB
ALBUMIN SERPL ELPH-MCNC: 3.7 G/DL — SIGNIFICANT CHANGE UP (ref 3.3–5)
ALP SERPL-CCNC: 65 U/L — SIGNIFICANT CHANGE UP (ref 40–120)
ALT FLD-CCNC: 30 U/L — SIGNIFICANT CHANGE UP (ref 12–78)
ANION GAP SERPL CALC-SCNC: 4 MMOL/L — LOW (ref 5–17)
AST SERPL-CCNC: 21 U/L — SIGNIFICANT CHANGE UP (ref 15–37)
BILIRUB SERPL-MCNC: 0.5 MG/DL — SIGNIFICANT CHANGE UP (ref 0.2–1.2)
BUN SERPL-MCNC: 23 MG/DL — SIGNIFICANT CHANGE UP (ref 7–23)
CALCIUM SERPL-MCNC: 9.2 MG/DL — SIGNIFICANT CHANGE UP (ref 8.5–10.1)
CHLORIDE SERPL-SCNC: 104 MMOL/L — SIGNIFICANT CHANGE UP (ref 96–108)
CO2 SERPL-SCNC: 28 MMOL/L — SIGNIFICANT CHANGE UP (ref 22–31)
CREAT SERPL-MCNC: 1.06 MG/DL — SIGNIFICANT CHANGE UP (ref 0.5–1.3)
EGFR: 60 ML/MIN/1.73M2 — SIGNIFICANT CHANGE UP
FLUAV AG NPH QL: SIGNIFICANT CHANGE UP
FLUBV AG NPH QL: SIGNIFICANT CHANGE UP
GLUCOSE SERPL-MCNC: 250 MG/DL — HIGH (ref 70–99)
LIDOCAIN IGE QN: 252 U/L — SIGNIFICANT CHANGE UP (ref 73–393)
NT-PROBNP SERPL-SCNC: 157 PG/ML — HIGH (ref 0–125)
POTASSIUM SERPL-MCNC: 4.5 MMOL/L — SIGNIFICANT CHANGE UP (ref 3.5–5.3)
POTASSIUM SERPL-SCNC: 4.5 MMOL/L — SIGNIFICANT CHANGE UP (ref 3.5–5.3)
PROT SERPL-MCNC: 7.3 GM/DL — SIGNIFICANT CHANGE UP (ref 6–8.3)
RSV RNA NPH QL NAA+NON-PROBE: SIGNIFICANT CHANGE UP
SARS-COV-2 RNA SPEC QL NAA+PROBE: SIGNIFICANT CHANGE UP
SODIUM SERPL-SCNC: 136 MMOL/L — SIGNIFICANT CHANGE UP (ref 135–145)
TROPONIN I, HIGH SENSITIVITY RESULT: 5.4 NG/L — SIGNIFICANT CHANGE UP

## 2022-09-15 PROCEDURE — 70551 MRI BRAIN STEM W/O DYE: CPT

## 2022-09-15 PROCEDURE — 99285 EMERGENCY DEPT VISIT HI MDM: CPT

## 2022-09-15 PROCEDURE — 81003 URINALYSIS AUTO W/O SCOPE: CPT

## 2022-09-15 PROCEDURE — 83036 HEMOGLOBIN GLYCOSYLATED A1C: CPT

## 2022-09-15 PROCEDURE — 83735 ASSAY OF MAGNESIUM: CPT

## 2022-09-15 PROCEDURE — 80048 BASIC METABOLIC PNL TOTAL CA: CPT

## 2022-09-15 PROCEDURE — 93010 ELECTROCARDIOGRAM REPORT: CPT

## 2022-09-15 PROCEDURE — 84484 ASSAY OF TROPONIN QUANT: CPT

## 2022-09-15 PROCEDURE — 85610 PROTHROMBIN TIME: CPT

## 2022-09-15 PROCEDURE — 86803 HEPATITIS C AB TEST: CPT

## 2022-09-15 PROCEDURE — 85027 COMPLETE CBC AUTOMATED: CPT

## 2022-09-15 PROCEDURE — 85730 THROMBOPLASTIN TIME PARTIAL: CPT

## 2022-09-15 PROCEDURE — 71045 X-RAY EXAM CHEST 1 VIEW: CPT | Mod: 26

## 2022-09-15 PROCEDURE — 84443 ASSAY THYROID STIM HORMONE: CPT

## 2022-09-15 PROCEDURE — 87086 URINE CULTURE/COLONY COUNT: CPT

## 2022-09-15 PROCEDURE — 85025 COMPLETE CBC W/AUTO DIFF WBC: CPT

## 2022-09-15 PROCEDURE — 70544 MR ANGIOGRAPHY HEAD W/O DYE: CPT

## 2022-09-15 PROCEDURE — 70547 MR ANGIOGRAPHY NECK W/O DYE: CPT

## 2022-09-15 PROCEDURE — 82962 GLUCOSE BLOOD TEST: CPT

## 2022-09-15 PROCEDURE — 36415 COLL VENOUS BLD VENIPUNCTURE: CPT

## 2022-09-15 PROCEDURE — 84439 ASSAY OF FREE THYROXINE: CPT

## 2022-09-15 PROCEDURE — 93880 EXTRACRANIAL BILAT STUDY: CPT

## 2022-09-15 PROCEDURE — 84100 ASSAY OF PHOSPHORUS: CPT

## 2022-09-15 PROCEDURE — 82947 ASSAY GLUCOSE BLOOD QUANT: CPT

## 2022-09-15 PROCEDURE — 70450 CT HEAD/BRAIN W/O DYE: CPT | Mod: 26,MA

## 2022-09-15 PROCEDURE — 93306 TTE W/DOPPLER COMPLETE: CPT

## 2022-09-15 RX ORDER — GABAPENTIN 400 MG/1
1 CAPSULE ORAL
Qty: 0 | Refills: 0 | DISCHARGE

## 2022-09-15 RX ORDER — INSULIN GLARGINE 100 [IU]/ML
40 INJECTION, SOLUTION SUBCUTANEOUS
Qty: 0 | Refills: 0 | DISCHARGE

## 2022-09-15 RX ORDER — INSULIN ASPART 100 [IU]/ML
20 INJECTION, SOLUTION SUBCUTANEOUS
Qty: 0 | Refills: 0 | DISCHARGE

## 2022-09-15 RX ORDER — ASPIRIN/CALCIUM CARB/MAGNESIUM 324 MG
1 TABLET ORAL
Qty: 0 | Refills: 0 | DISCHARGE

## 2022-09-15 RX ORDER — CHOLECALCIFEROL (VITAMIN D3) 125 MCG
1 CAPSULE ORAL
Qty: 0 | Refills: 0 | DISCHARGE

## 2022-09-15 RX ORDER — ATORVASTATIN CALCIUM 80 MG/1
1 TABLET, FILM COATED ORAL
Qty: 0 | Refills: 0 | DISCHARGE

## 2022-09-15 RX ORDER — INSULIN GLARGINE 100 [IU]/ML
60 INJECTION, SOLUTION SUBCUTANEOUS
Qty: 0 | Refills: 0 | DISCHARGE

## 2022-09-15 RX ORDER — ESCITALOPRAM OXALATE 10 MG/1
1 TABLET, FILM COATED ORAL
Qty: 0 | Refills: 0 | DISCHARGE

## 2022-09-15 RX ORDER — CLOPIDOGREL BISULFATE 75 MG/1
1 TABLET, FILM COATED ORAL
Qty: 0 | Refills: 0 | DISCHARGE

## 2022-09-15 RX ORDER — FENOFIBRATE,MICRONIZED 130 MG
1 CAPSULE ORAL
Qty: 0 | Refills: 0 | DISCHARGE

## 2022-09-15 RX ORDER — METOPROLOL TARTRATE 50 MG
1 TABLET ORAL
Qty: 0 | Refills: 0 | DISCHARGE

## 2022-09-15 RX ORDER — METFORMIN HYDROCHLORIDE 850 MG/1
1 TABLET ORAL
Qty: 0 | Refills: 0 | DISCHARGE

## 2022-09-15 RX ORDER — FAMOTIDINE 10 MG/ML
1 INJECTION INTRAVENOUS
Qty: 0 | Refills: 0 | DISCHARGE

## 2022-09-15 RX ORDER — LOSARTAN POTASSIUM 100 MG/1
1 TABLET, FILM COATED ORAL
Qty: 0 | Refills: 0 | DISCHARGE

## 2022-09-15 NOTE — ED PROVIDER NOTE - NSICDXPASTMEDICALHX_GEN_ALL_CORE_FT
PAST MEDICAL HISTORY:  Depression     Diabetes mellitus type 2 in nonobese     High cholesterol     HTN (hypertension)     Neuropathy     Stented coronary artery x2

## 2022-09-15 NOTE — ED ADULT TRIAGE NOTE - CHIEF COMPLAINT QUOTE
Pt presents to ER c/o weakness, decreased PO intake and episodes of dizziness. Onset of symptoms began 3 days PTA. Pt reports having 2 syncopal episodes where she fainted. Pt reports her SBP at home has been in the 160's. Denies CP/SOB

## 2022-09-15 NOTE — ED PROVIDER NOTE - HIV OFFER
Patient called to get number for Gastroenterology that [facility] referred her to   I provided Steffany Perdomo number of [phone] Opt out

## 2022-09-15 NOTE — ED ADULT NURSE REASSESSMENT NOTE - NS ED NURSE REASSESS COMMENT FT1
assume care from [id number] rn, pt resting comfortably, no s&s os distress, VSS waiting for room. Pt continues on monitor.

## 2022-09-15 NOTE — ED STATDOCS - PROGRESS NOTE DETAILS
Mariam Petit for attending Dr. Guy:  63 y/o female with a PMHx of CAD s/p coronary artery stent placement x2, HTN, HLD, DM with neuropathy, depression, and R foot fracture 3 years ago presents to the ED c/p syncope x few days. Will send pt to main ED for further evaluation.

## 2022-09-15 NOTE — ED STATDOCS - ATTENDING APP SHARED VISIT CONTRIBUTION OF CARE
I, Armand Lara MD,  performed the initial face to face bedside interview with this patient regarding history of present illness, review of symptoms and relevant past medical, social and family history.  I completed an independent physical examination.  I was the initial provider who evaluated this patient.   I personally saw the patient and performed a substantive portion of the visit including all aspects of the medical decision making.  I have signed out the follow up of any pending tests (i.e. labs, radiological studies) to the GHISLAINE.  I have communicated the patient’s plan of care and disposition with the GHISLAINE.  The history, relevant review of systems, past medical and surgical history, medical decision making, and physical examination was documented by the scribe in my presence and I attest to the accuracy of the documentation.

## 2022-09-15 NOTE — ED ADULT NURSE NOTE - OBJECTIVE STATEMENT
Pt presents to ED for several syncopal episodes at home for past 3 days. Pt denies any dizziness, chest pain, SOB at this time. Pt hx of Diabetes. pt hx of cardiac stents.

## 2022-09-15 NOTE — ED STATDOCS - NS ED ATTENDING STATEMENT MOD
This was a shared visit with the GHISLAINE. I reviewed and verified the documentation and independently performed the documented:

## 2022-09-15 NOTE — ED PROVIDER NOTE - OBJECTIVE STATEMENT
61 y/o female with a PMHx of CAD s/p coronary artery stent placement x2, HTN, HLD, DM with neuropathy, depression, and R foot fracture 3 years ago presents to the ED c/p syncope yesterday. Pt was at home sitting on a chair, got dizzy and fell off the chair. The incident happened around 2pm. Pt was feeling lightheadedness before the incident occurred. +LOC for couple minutes. Pt was sweating, and had blurry vision after the incident. Denies SOB, chest pain. Pt claims her blood pressure has been high. Claimed the blood pressure before the incident was 175/102. Pt c/o headache yesterday. Non smoker

## 2022-09-15 NOTE — ED PROVIDER NOTE - CLINICAL SUMMARY MEDICAL DECISION MAKING FREE TEXT BOX
pt with hx coronary artery stent placement x2, HTN, here s/p syncope yesterday, unclear if blood pressure was high prior or after the episode. Labs, EKG, chest x-ray, pt to be admitted for syncope.

## 2022-09-15 NOTE — PHARMACOTHERAPY INTERVENTION NOTE - COMMENTS
Medication history complete, reviewed medications with patient and confirmed with doctor [facility]. Medication history complete, reviewed medications with patient daughter Shruthi [phone] and confirmed with doctor [facility] hx.

## 2022-09-15 NOTE — ED PROVIDER NOTE - MUSCULOSKELETAL, MLM
Spine appears normal, range of motion is not limited, no muscle or joint tenderness, old deformity of the right foot

## 2022-09-16 DIAGNOSIS — I10 ESSENTIAL (PRIMARY) HYPERTENSION: ICD-10-CM

## 2022-09-16 DIAGNOSIS — R55 SYNCOPE AND COLLAPSE: ICD-10-CM

## 2022-09-16 DIAGNOSIS — I25.10 ATHEROSCLEROTIC HEART DISEASE OF NATIVE CORONARY ARTERY WITHOUT ANGINA PECTORIS: ICD-10-CM

## 2022-09-16 LAB
A1C WITH ESTIMATED AVERAGE GLUCOSE RESULT: 7 % — HIGH (ref 4–5.6)
ANION GAP SERPL CALC-SCNC: 2 MMOL/L — LOW (ref 5–17)
APPEARANCE UR: CLEAR — SIGNIFICANT CHANGE UP
BILIRUB UR-MCNC: NEGATIVE — SIGNIFICANT CHANGE UP
BUN SERPL-MCNC: 20 MG/DL — SIGNIFICANT CHANGE UP (ref 7–23)
CALCIUM SERPL-MCNC: 9.1 MG/DL — SIGNIFICANT CHANGE UP (ref 8.5–10.1)
CHLORIDE SERPL-SCNC: 105 MMOL/L — SIGNIFICANT CHANGE UP (ref 96–108)
CO2 SERPL-SCNC: 31 MMOL/L — SIGNIFICANT CHANGE UP (ref 22–31)
COLOR SPEC: SIGNIFICANT CHANGE UP
CREAT SERPL-MCNC: 0.87 MG/DL — SIGNIFICANT CHANGE UP (ref 0.5–1.3)
DIFF PNL FLD: NEGATIVE — SIGNIFICANT CHANGE UP
EGFR: 76 ML/MIN/1.73M2 — SIGNIFICANT CHANGE UP
ESTIMATED AVERAGE GLUCOSE: 154 MG/DL — HIGH (ref 68–114)
GLUCOSE SERPL-MCNC: 154 MG/DL — HIGH (ref 70–99)
GLUCOSE UR QL: NEGATIVE — SIGNIFICANT CHANGE UP
HCT VFR BLD CALC: 30.6 % — LOW (ref 34.5–45)
HCV AB S/CO SERPL IA: 0.22 S/CO — SIGNIFICANT CHANGE UP (ref 0–0.99)
HCV AB SERPL-IMP: SIGNIFICANT CHANGE UP
HGB BLD-MCNC: 10.2 G/DL — LOW (ref 11.5–15.5)
KETONES UR-MCNC: NEGATIVE — SIGNIFICANT CHANGE UP
LEUKOCYTE ESTERASE UR-ACNC: NEGATIVE — SIGNIFICANT CHANGE UP
MAGNESIUM SERPL-MCNC: 1.5 MG/DL — LOW (ref 1.6–2.6)
MCHC RBC-ENTMCNC: 28.6 PG — SIGNIFICANT CHANGE UP (ref 27–34)
MCHC RBC-ENTMCNC: 33.3 GM/DL — SIGNIFICANT CHANGE UP (ref 32–36)
MCV RBC AUTO: 85.7 FL — SIGNIFICANT CHANGE UP (ref 80–100)
NITRITE UR-MCNC: NEGATIVE — SIGNIFICANT CHANGE UP
PH UR: 6 — SIGNIFICANT CHANGE UP (ref 5–8)
PHOSPHATE SERPL-MCNC: 3.8 MG/DL — SIGNIFICANT CHANGE UP (ref 2.5–4.5)
PLATELET # BLD AUTO: 210 K/UL — SIGNIFICANT CHANGE UP (ref 150–400)
POTASSIUM SERPL-MCNC: 4.7 MMOL/L — SIGNIFICANT CHANGE UP (ref 3.5–5.3)
POTASSIUM SERPL-SCNC: 4.7 MMOL/L — SIGNIFICANT CHANGE UP (ref 3.5–5.3)
PROT UR-MCNC: NEGATIVE — SIGNIFICANT CHANGE UP
RBC # BLD: 3.57 M/UL — LOW (ref 3.8–5.2)
RBC # FLD: 13.3 % — SIGNIFICANT CHANGE UP (ref 10.3–14.5)
SODIUM SERPL-SCNC: 138 MMOL/L — SIGNIFICANT CHANGE UP (ref 135–145)
SP GR SPEC: 1 — LOW (ref 1.01–1.02)
TSH SERPL-MCNC: 5.03 UU/ML — HIGH (ref 0.34–4.82)
UROBILINOGEN FLD QL: NEGATIVE — SIGNIFICANT CHANGE UP
WBC # BLD: 7.26 K/UL — SIGNIFICANT CHANGE UP (ref 3.8–10.5)
WBC # FLD AUTO: 7.26 K/UL — SIGNIFICANT CHANGE UP (ref 3.8–10.5)

## 2022-09-16 PROCEDURE — 99222 1ST HOSP IP/OBS MODERATE 55: CPT

## 2022-09-16 PROCEDURE — 99223 1ST HOSP IP/OBS HIGH 75: CPT

## 2022-09-16 PROCEDURE — 12345: CPT | Mod: NC

## 2022-09-16 PROCEDURE — 93306 TTE W/DOPPLER COMPLETE: CPT | Mod: 26

## 2022-09-16 RX ORDER — DEXTROSE 50 % IN WATER 50 %
15 SYRINGE (ML) INTRAVENOUS ONCE
Refills: 0 | Status: ACTIVE | OUTPATIENT
Start: 2022-09-16 | End: 2023-08-15

## 2022-09-16 RX ORDER — INFLUENZA VIRUS VACCINE 15; 15; 15; 15 UG/.5ML; UG/.5ML; UG/.5ML; UG/.5ML
0.5 SUSPENSION INTRAMUSCULAR ONCE
Refills: 0 | Status: DISCONTINUED | OUTPATIENT
Start: 2022-09-16 | End: 2022-09-19

## 2022-09-16 RX ORDER — GABAPENTIN 400 MG/1
300 CAPSULE ORAL THREE TIMES A DAY
Refills: 0 | Status: DISCONTINUED | OUTPATIENT
Start: 2022-09-16 | End: 2022-09-19

## 2022-09-16 RX ORDER — GLUCAGON INJECTION, SOLUTION 0.5 MG/.1ML
1 INJECTION, SOLUTION SUBCUTANEOUS ONCE
Refills: 0 | Status: ACTIVE | OUTPATIENT
Start: 2022-09-16 | End: 2023-08-15

## 2022-09-16 RX ORDER — DEXTROSE 50 % IN WATER 50 %
25 SYRINGE (ML) INTRAVENOUS ONCE
Refills: 0 | Status: ACTIVE | OUTPATIENT
Start: 2022-09-16

## 2022-09-16 RX ORDER — INSULIN LISPRO 100/ML
20 VIAL (ML) SUBCUTANEOUS
Refills: 0 | Status: ACTIVE | OUTPATIENT
Start: 2022-09-16 | End: 2023-08-15

## 2022-09-16 RX ORDER — ATORVASTATIN CALCIUM 80 MG/1
80 TABLET, FILM COATED ORAL AT BEDTIME
Refills: 0 | Status: DISCONTINUED | OUTPATIENT
Start: 2022-09-16 | End: 2022-09-19

## 2022-09-16 RX ORDER — SODIUM CHLORIDE 9 MG/ML
1000 INJECTION INTRAMUSCULAR; INTRAVENOUS; SUBCUTANEOUS ONCE
Refills: 0 | Status: COMPLETED | OUTPATIENT
Start: 2022-09-16 | End: 2022-09-16

## 2022-09-16 RX ORDER — INSULIN GLARGINE 100 [IU]/ML
40 INJECTION, SOLUTION SUBCUTANEOUS
Refills: 0 | Status: ACTIVE | OUTPATIENT
Start: 2022-09-16 | End: 2023-08-15

## 2022-09-16 RX ORDER — ASPIRIN/CALCIUM CARB/MAGNESIUM 324 MG
81 TABLET ORAL DAILY
Refills: 0 | Status: DISCONTINUED | OUTPATIENT
Start: 2022-09-16 | End: 2022-09-19

## 2022-09-16 RX ORDER — INSULIN LISPRO 100/ML
VIAL (ML) SUBCUTANEOUS AT BEDTIME
Refills: 0 | Status: ACTIVE | OUTPATIENT
Start: 2022-09-16 | End: 2023-08-15

## 2022-09-16 RX ORDER — SODIUM CHLORIDE 9 MG/ML
1000 INJECTION, SOLUTION INTRAVENOUS
Refills: 0 | Status: ACTIVE | OUTPATIENT
Start: 2022-09-16 | End: 2023-08-15

## 2022-09-16 RX ORDER — MAGNESIUM SULFATE 500 MG/ML
1 VIAL (ML) INJECTION ONCE
Refills: 0 | Status: COMPLETED | OUTPATIENT
Start: 2022-09-16 | End: 2022-09-16

## 2022-09-16 RX ORDER — INSULIN GLARGINE 100 [IU]/ML
40 INJECTION, SOLUTION SUBCUTANEOUS
Refills: 0 | Status: DISCONTINUED | OUTPATIENT
Start: 2022-09-16 | End: 2022-09-16

## 2022-09-16 RX ORDER — METOPROLOL TARTRATE 50 MG
25 TABLET ORAL
Refills: 0 | Status: DISCONTINUED | OUTPATIENT
Start: 2022-09-16 | End: 2022-09-19

## 2022-09-16 RX ORDER — DEXTROSE 50 % IN WATER 50 %
12.5 SYRINGE (ML) INTRAVENOUS ONCE
Refills: 0 | Status: ACTIVE | OUTPATIENT
Start: 2022-09-16

## 2022-09-16 RX ORDER — ESCITALOPRAM OXALATE 10 MG/1
10 TABLET, FILM COATED ORAL DAILY
Refills: 0 | Status: DISCONTINUED | OUTPATIENT
Start: 2022-09-16 | End: 2022-09-19

## 2022-09-16 RX ORDER — INSULIN LISPRO 100/ML
VIAL (ML) SUBCUTANEOUS
Refills: 0 | Status: ACTIVE | OUTPATIENT
Start: 2022-09-16 | End: 2023-08-15

## 2022-09-16 RX ORDER — LOSARTAN POTASSIUM 100 MG/1
100 TABLET, FILM COATED ORAL DAILY
Refills: 0 | Status: DISCONTINUED | OUTPATIENT
Start: 2022-09-16 | End: 2022-09-19

## 2022-09-16 RX ADMIN — Medication 2: at 17:06

## 2022-09-16 RX ADMIN — GABAPENTIN 300 MILLIGRAM(S): 400 CAPSULE ORAL at 13:09

## 2022-09-16 RX ADMIN — INSULIN GLARGINE 40 UNIT(S): 100 INJECTION, SOLUTION SUBCUTANEOUS at 21:31

## 2022-09-16 RX ADMIN — Medication 100 GRAM(S): at 11:11

## 2022-09-16 RX ADMIN — GABAPENTIN 300 MILLIGRAM(S): 400 CAPSULE ORAL at 21:32

## 2022-09-16 RX ADMIN — SODIUM CHLORIDE 9999 MILLILITER(S): 9 INJECTION INTRAMUSCULAR; INTRAVENOUS; SUBCUTANEOUS at 11:03

## 2022-09-16 RX ADMIN — INSULIN GLARGINE 40 UNIT(S): 100 INJECTION, SOLUTION SUBCUTANEOUS at 08:57

## 2022-09-16 RX ADMIN — GABAPENTIN 300 MILLIGRAM(S): 400 CAPSULE ORAL at 05:29

## 2022-09-16 RX ADMIN — Medication 1 TABLET(S): at 11:03

## 2022-09-16 RX ADMIN — Medication 20 UNIT(S): at 08:58

## 2022-09-16 RX ADMIN — Medication 20 UNIT(S): at 17:07

## 2022-09-16 RX ADMIN — Medication 81 MILLIGRAM(S): at 11:03

## 2022-09-16 RX ADMIN — Medication 25 MILLIGRAM(S): at 11:03

## 2022-09-16 RX ADMIN — ESCITALOPRAM OXALATE 10 MILLIGRAM(S): 10 TABLET, FILM COATED ORAL at 11:03

## 2022-09-16 RX ADMIN — ATORVASTATIN CALCIUM 80 MILLIGRAM(S): 80 TABLET, FILM COATED ORAL at 21:32

## 2022-09-16 RX ADMIN — Medication 20 UNIT(S): at 13:09

## 2022-09-16 RX ADMIN — Medication 25 MILLIGRAM(S): at 21:32

## 2022-09-16 RX ADMIN — LOSARTAN POTASSIUM 100 MILLIGRAM(S): 100 TABLET, FILM COATED ORAL at 11:03

## 2022-09-16 NOTE — PATIENT PROFILE ADULT - FALL HARM RISK - HARM RISK INTERVENTIONS

## 2022-09-16 NOTE — H&P ADULT - SOCIAL HISTORY
Dad is concerned about the dark [place] around the belly button. Advised that is normal with the silver nitrate.    No

## 2022-09-16 NOTE — H&P ADULT - ASSESSMENT
Patient is a 63 y/o Maori speaking female with a PMH of CAD s/p 2 stents (placed in [facility] 6-7 years ago), HTN, HLD, T2DM (complicated by neuropathy), and depression presenting to the ED at the insistence of her family after she fainted the day prior to admission currently admitted for syncope work-up.     #Syncope   - Given patient's episode appears that the episode was likely reflex-mediated - i.e vasovagal syncope as she had a prodrome (dizziness, nausea), loss of consciousness, and a post-syncopal phase.   - Does not seem to be cardiac related - EKG in ED with normal sinus rhythm with occasional PVCs; however would still obtain echo   - F/u on cardiology consult - patient does not have a cardiologist   - Obtain orthostatic vitals     #HTN   - Reports elevated readings at home - systolic ranging from 160s to 170s; unsure of diastolic readings   - Upon arrival to ED - BP stable   - Continue metoprolol tartrate 25 mg BID   - Continue losartan 100 mg daily     #CAD s/p 2 stents   - Continue aspirin 81 mg daily   - Continue atorvastatin 80 mg daily     #T2DM with neuropathy   - At home on Lantus 40 mg BID and 20 units of lispro TID   - Will hold metformin while inpatient   - Carb-consistent diet   - ISS   - Continue gabapentin 300 mg TID   - F/u AM A1c     #Depression   - Continue escitalopram 10 mg daily     #DVT ppx   IMPROVE VTE Individual Risk Assessment    RISK                                                                Points    [  ] Previous VTE                                                  3    [  ] Thrombophilia                                               2    [  ] Lower limb paralysis                                      2        (unable to hold up >15 seconds)      [  ] Current Cancer                                              2         (within 6 months)    [  ] Immobilization > 24 hrs                                1    [  ] ICU/CCU stay > 24 hours                              1    [  ] Age > 60                                                      1    IMPROVE VTE Score ___0______  - Will use SCDs    #Code Status   - FULL CODE

## 2022-09-16 NOTE — H&P ADULT - HISTORY OF PRESENT ILLNESS
Patient is a 63 y/o Slovenian speaking female with a PMH of CAD s/p 2 stents (placed in [facility] 6-7 years ago), HTN, HLD, T2DM (complicated by neuropathy), and depression presenting to the ED at the insistence of her family after she fainted the day prior to admission. Before she fainted she was resting on the sofa watching her granddaughter play when she suddenly felt dizzy and nauseous and then fell off the sofa. The fall was witnessed by her daughter-in-law. Immediately after the episode she reports sweating     The patient reports that since the past 3 days prior to admission she had been feeling dizzy. This prompted her to check her blood pressure (she usually does not regularly check her blood pressure at home). She noted having elevated readings at home despite being compliant with her home blood pressure medication. Reported having systolic values in the 160-170 range; does not recall the diastolic values but says prior to her syncopal episode her BP reading was 175/102. She does not report being in pain or stress lately.   Patient is a 61 y/o Lao speaking female with a PMH of CAD s/p 2 stents (placed in [facility] 6-7 years ago), HTN, HLD, T2DM (complicated by neuropathy), and depression presenting to the ED at the insistence of her family after she fainted the day prior to admission. Before she fainted she was resting on the sofa watching her granddaughter play when she suddenly felt dizzy and nauseous and then fell off the sofa. The fall was witnessed by her daughter-in-law. Immediately after the episode she reports sweating excessively and her body feeling cold. This is the first time she experienced such an event. At the time of the incident she denied having chest pain, palpitations, shortness of breath, or loss of vision. She checked her blood glucose around the same time and reports it was 133. She mentions that since the past 3 days prior to admission she had been feeling dizzy. This prompted her to check her blood pressure (she usually does not regularly check her blood pressure at home). She noted having elevated readings at home despite being compliant with her home blood pressure medication. Reported having systolic values in the 160-170 range; does not recall the diastolic values but says prior to her syncopal episode her BP reading was 175/102. She does not report being in pain or stress lately.  She stays well hydrated throughout the day. She denies any further episodes since yesterday.     Upon arrival to the ED, vitals were stable. /58 HR 57 RR 16 SpO2 96% on RA. No significant labs. CT head did not show any acute infarct, hemorrhage or space-occupying lesion.

## 2022-09-16 NOTE — H&P ADULT - NSHPREVIEWOFSYSTEMS_GEN_ALL_CORE
REVIEW OF SYSTEMS:  CONSTITUTIONAL: No weakness, fevers or chills  EYES/ENT: No visual changes; no vertigo   NECK: No pain or stiffness  RESPIRATORY: No cough, wheezing, hemoptysis; No shortness of breath  CARDIOVASCULAR: No chest pain or palpitations  GASTROINTESTINAL: No abdominal or epigastric pain. No nausea, vomiting, or hematemesis; No diarrhea or constipation. No melena or hematochezia.  GENITOURINARY: No dysuria, frequency or hematuria  NEUROLOGICAL: No numbness or weakness  SKIN: No itching, burning, rashes, or lesions   All other review of systems is negative unless indicated above

## 2022-09-16 NOTE — CONSULT NOTE ADULT - PROBLEM SELECTOR RECOMMENDATION 9
Unclear etiology. No arrhythmia noted at this time. Pt reports symptoms prior to the day of admission. Consider metabolic/medication etiology. Echo to evaluate LV fx.

## 2022-09-16 NOTE — H&P ADULT - NSHPPHYSICALEXAM_GEN_ALL_CORE
T(C): 36.3 (09-15-22 @ 23:05), Max: 36.8 (09-15-22 @ 17:21)  HR: 77 (09-15-22 @ 23:05) (57 - 77)  BP: 162/74 (09-15-22 @ 23:05) (123/58 - 162/74)  RR: 18 (09-15-22 @ 23:05) (16 - 18)  SpO2: 99% (09-15-22 @ 23:05) (96% - 99%)    CONSTITUTIONAL: Well groomed, no apparent distress  EYES: EOMI  ENMT: Oral mucosa with moist membranes. Normal dentition  NECK: Supple  RESP: No respiratory distress, no use of accessory muscles; CTA b/l  CV: RRR, +S1S2  GI: Soft, NT, ND, no rebound, no guarding  MSK: moves all extremities   SKIN: No rashes or ulcers noted  PSYCH: Appropriate insight/judgment; A+O x 3

## 2022-09-17 LAB
ANION GAP SERPL CALC-SCNC: 4 MMOL/L — LOW (ref 5–17)
BUN SERPL-MCNC: 13 MG/DL — SIGNIFICANT CHANGE UP (ref 7–23)
CALCIUM SERPL-MCNC: 9.3 MG/DL — SIGNIFICANT CHANGE UP (ref 8.5–10.1)
CHLORIDE SERPL-SCNC: 108 MMOL/L — SIGNIFICANT CHANGE UP (ref 96–108)
CO2 SERPL-SCNC: 30 MMOL/L — SIGNIFICANT CHANGE UP (ref 22–31)
CREAT SERPL-MCNC: 0.76 MG/DL — SIGNIFICANT CHANGE UP (ref 0.5–1.3)
EGFR: 90 ML/MIN/1.73M2 — SIGNIFICANT CHANGE UP
GLUCOSE SERPL-MCNC: 137 MG/DL — HIGH (ref 70–99)
GLUCOSE SERPL-MCNC: 146 MG/DL — HIGH (ref 70–99)
MAGNESIUM SERPL-MCNC: 1.8 MG/DL — SIGNIFICANT CHANGE UP (ref 1.6–2.6)
POTASSIUM SERPL-MCNC: 4.2 MMOL/L — SIGNIFICANT CHANGE UP (ref 3.5–5.3)
POTASSIUM SERPL-SCNC: 4.2 MMOL/L — SIGNIFICANT CHANGE UP (ref 3.5–5.3)
SODIUM SERPL-SCNC: 142 MMOL/L — SIGNIFICANT CHANGE UP (ref 135–145)
T4 FREE SERPL-MCNC: 1.02 NG/DL — SIGNIFICANT CHANGE UP (ref 0.76–1.46)

## 2022-09-17 PROCEDURE — 70551 MRI BRAIN STEM W/O DYE: CPT | Mod: 26

## 2022-09-17 PROCEDURE — 70547 MR ANGIOGRAPHY NECK W/O DYE: CPT | Mod: 26

## 2022-09-17 PROCEDURE — 70544 MR ANGIOGRAPHY HEAD W/O DYE: CPT | Mod: 26,59

## 2022-09-17 PROCEDURE — 99232 SBSQ HOSP IP/OBS MODERATE 35: CPT

## 2022-09-17 PROCEDURE — 99233 SBSQ HOSP IP/OBS HIGH 50: CPT

## 2022-09-17 RX ORDER — MECLIZINE HCL 12.5 MG
25 TABLET ORAL ONCE
Refills: 0 | Status: COMPLETED | OUTPATIENT
Start: 2022-09-17 | End: 2022-09-17

## 2022-09-17 RX ORDER — DEXTROSE 50 % IN WATER 50 %
25 SYRINGE (ML) INTRAVENOUS ONCE
Refills: 0 | Status: DISCONTINUED | OUTPATIENT
Start: 2022-09-17 | End: 2022-09-18

## 2022-09-17 RX ORDER — INSULIN LISPRO 100/ML
6 VIAL (ML) SUBCUTANEOUS ONCE
Refills: 0 | Status: COMPLETED | OUTPATIENT
Start: 2022-09-17 | End: 2022-09-17

## 2022-09-17 RX ORDER — GLUCAGON INJECTION, SOLUTION 0.5 MG/.1ML
1 INJECTION, SOLUTION SUBCUTANEOUS ONCE
Refills: 0 | Status: DISCONTINUED | OUTPATIENT
Start: 2022-09-17 | End: 2022-09-18

## 2022-09-17 RX ORDER — INSULIN GLARGINE 100 [IU]/ML
40 INJECTION, SOLUTION SUBCUTANEOUS EVERY MORNING
Refills: 0 | Status: DISCONTINUED | OUTPATIENT
Start: 2022-09-18 | End: 2022-09-18

## 2022-09-17 RX ORDER — SODIUM CHLORIDE 9 MG/ML
1000 INJECTION, SOLUTION INTRAVENOUS
Refills: 0 | Status: DISCONTINUED | OUTPATIENT
Start: 2022-09-17 | End: 2022-09-18

## 2022-09-17 RX ORDER — INSULIN LISPRO 100/ML
VIAL (ML) SUBCUTANEOUS AT BEDTIME
Refills: 0 | Status: DISCONTINUED | OUTPATIENT
Start: 2022-09-17 | End: 2022-09-18

## 2022-09-17 RX ORDER — DEXTROSE 50 % IN WATER 50 %
15 SYRINGE (ML) INTRAVENOUS ONCE
Refills: 0 | Status: DISCONTINUED | OUTPATIENT
Start: 2022-09-17 | End: 2022-09-18

## 2022-09-17 RX ORDER — MECLIZINE HCL 12.5 MG
25 TABLET ORAL EVERY 8 HOURS
Refills: 0 | Status: DISCONTINUED | OUTPATIENT
Start: 2022-09-17 | End: 2022-09-19

## 2022-09-17 RX ORDER — ENOXAPARIN SODIUM 100 MG/ML
40 INJECTION SUBCUTANEOUS EVERY 24 HOURS
Refills: 0 | Status: DISCONTINUED | OUTPATIENT
Start: 2022-09-17 | End: 2022-09-19

## 2022-09-17 RX ORDER — DEXTROSE 50 % IN WATER 50 %
12.5 SYRINGE (ML) INTRAVENOUS ONCE
Refills: 0 | Status: DISCONTINUED | OUTPATIENT
Start: 2022-09-17 | End: 2022-09-18

## 2022-09-17 RX ORDER — INSULIN LISPRO 100/ML
VIAL (ML) SUBCUTANEOUS
Refills: 0 | Status: DISCONTINUED | OUTPATIENT
Start: 2022-09-17 | End: 2022-09-18

## 2022-09-17 RX ADMIN — GABAPENTIN 300 MILLIGRAM(S): 400 CAPSULE ORAL at 20:32

## 2022-09-17 RX ADMIN — Medication 25 MILLIGRAM(S): at 14:01

## 2022-09-17 RX ADMIN — Medication 25 MILLIGRAM(S): at 20:32

## 2022-09-17 RX ADMIN — Medication 8: at 20:35

## 2022-09-17 RX ADMIN — ESCITALOPRAM OXALATE 10 MILLIGRAM(S): 10 TABLET, FILM COATED ORAL at 10:26

## 2022-09-17 RX ADMIN — Medication 20 UNIT(S): at 09:31

## 2022-09-17 RX ADMIN — GABAPENTIN 300 MILLIGRAM(S): 400 CAPSULE ORAL at 05:16

## 2022-09-17 RX ADMIN — Medication 6 UNIT(S): at 22:40

## 2022-09-17 RX ADMIN — GABAPENTIN 300 MILLIGRAM(S): 400 CAPSULE ORAL at 14:01

## 2022-09-17 RX ADMIN — LOSARTAN POTASSIUM 100 MILLIGRAM(S): 100 TABLET, FILM COATED ORAL at 10:26

## 2022-09-17 RX ADMIN — Medication 1 TABLET(S): at 10:26

## 2022-09-17 RX ADMIN — Medication 20 UNIT(S): at 12:23

## 2022-09-17 RX ADMIN — Medication 1: at 12:13

## 2022-09-17 RX ADMIN — Medication 81 MILLIGRAM(S): at 10:30

## 2022-09-17 RX ADMIN — Medication 25 MILLIGRAM(S): at 10:27

## 2022-09-17 RX ADMIN — ENOXAPARIN SODIUM 40 MILLIGRAM(S): 100 INJECTION SUBCUTANEOUS at 20:33

## 2022-09-17 RX ADMIN — ATORVASTATIN CALCIUM 80 MILLIGRAM(S): 80 TABLET, FILM COATED ORAL at 20:32

## 2022-09-17 RX ADMIN — INSULIN GLARGINE 40 UNIT(S): 100 INJECTION, SOLUTION SUBCUTANEOUS at 09:32

## 2022-09-17 NOTE — PROGRESS NOTE ADULT - PROBLEM SELECTOR PLAN 2
Problem: CAD (coronary artery disease).   ·  Recommendation: Troponin negative x 2;  ECG does not reveal acute process.  .  Continue ASA, statin, BB

## 2022-09-17 NOTE — PROGRESS NOTE ADULT - PROBLEM SELECTOR PLAN 1
Problem: Syncope.   ·  Recommendation: Possibly vasovagal; No arrhythmia noted at this time; Tele shows normal sinus rhythm rate controlled with APC's.  Would consider monitor as outpt ; trop negative x 2;    Echo shows normal LVF Problem: Syncope.   ·  Recommendation: Possibly vasovagal; No arrhythmia noted at this time; Tele shows normal sinus rhythm rate controlled with APC's.  Would consider monitor as outpt ; trop negative x 2;    Echo shows normal LVF. Will follow prn

## 2022-09-17 NOTE — PROGRESS NOTE ADULT - PROBLEM SELECTOR PLAN 3
Problem: HTN (hypertension).   ·  Recommendation: Continue losartan and metoprolol; titrate as needed; low sodium diet

## 2022-09-18 LAB
ANION GAP SERPL CALC-SCNC: 3 MMOL/L — LOW (ref 5–17)
BUN SERPL-MCNC: 17 MG/DL — SIGNIFICANT CHANGE UP (ref 7–23)
CALCIUM SERPL-MCNC: 9 MG/DL — SIGNIFICANT CHANGE UP (ref 8.5–10.1)
CHLORIDE SERPL-SCNC: 102 MMOL/L — SIGNIFICANT CHANGE UP (ref 96–108)
CO2 SERPL-SCNC: 30 MMOL/L — SIGNIFICANT CHANGE UP (ref 22–31)
CREAT SERPL-MCNC: 0.91 MG/DL — SIGNIFICANT CHANGE UP (ref 0.5–1.3)
CULTURE RESULTS: SIGNIFICANT CHANGE UP
EGFR: 72 ML/MIN/1.73M2 — SIGNIFICANT CHANGE UP
GLUCOSE SERPL-MCNC: 259 MG/DL — HIGH (ref 70–99)
POTASSIUM SERPL-MCNC: 4.3 MMOL/L — SIGNIFICANT CHANGE UP (ref 3.5–5.3)
POTASSIUM SERPL-SCNC: 4.3 MMOL/L — SIGNIFICANT CHANGE UP (ref 3.5–5.3)
SODIUM SERPL-SCNC: 135 MMOL/L — SIGNIFICANT CHANGE UP (ref 135–145)
SPECIMEN SOURCE: SIGNIFICANT CHANGE UP

## 2022-09-18 PROCEDURE — 99253 IP/OBS CNSLTJ NEW/EST LOW 45: CPT | Mod: 1L,GC

## 2022-09-18 PROCEDURE — 99233 SBSQ HOSP IP/OBS HIGH 50: CPT

## 2022-09-18 RX ORDER — INSULIN LISPRO 100/ML
VIAL (ML) SUBCUTANEOUS
Refills: 0 | Status: DISCONTINUED | OUTPATIENT
Start: 2022-09-18 | End: 2022-09-18

## 2022-09-18 RX ORDER — INSULIN LISPRO 100/ML
6 VIAL (ML) SUBCUTANEOUS
Refills: 0 | Status: DISCONTINUED | OUTPATIENT
Start: 2022-09-18 | End: 2022-09-18

## 2022-09-18 RX ORDER — DEXTROSE 50 % IN WATER 50 %
25 SYRINGE (ML) INTRAVENOUS ONCE
Refills: 0 | Status: DISCONTINUED | OUTPATIENT
Start: 2022-09-18 | End: 2022-09-19

## 2022-09-18 RX ORDER — INSULIN LISPRO 100/ML
VIAL (ML) SUBCUTANEOUS
Refills: 0 | Status: DISCONTINUED | OUTPATIENT
Start: 2022-09-18 | End: 2022-09-19

## 2022-09-18 RX ORDER — INSULIN GLARGINE 100 [IU]/ML
40 INJECTION, SOLUTION SUBCUTANEOUS ONCE
Refills: 0 | Status: COMPLETED | OUTPATIENT
Start: 2022-09-18 | End: 2022-09-18

## 2022-09-18 RX ORDER — SODIUM CHLORIDE 9 MG/ML
1000 INJECTION, SOLUTION INTRAVENOUS
Refills: 0 | Status: DISCONTINUED | OUTPATIENT
Start: 2022-09-18 | End: 2022-09-19

## 2022-09-18 RX ORDER — INSULIN GLARGINE 100 [IU]/ML
35 INJECTION, SOLUTION SUBCUTANEOUS AT BEDTIME
Refills: 0 | Status: DISCONTINUED | OUTPATIENT
Start: 2022-09-18 | End: 2022-09-19

## 2022-09-18 RX ORDER — DEXTROSE 50 % IN WATER 50 %
25 SYRINGE (ML) INTRAVENOUS ONCE
Refills: 0 | Status: DISCONTINUED | OUTPATIENT
Start: 2022-09-18 | End: 2022-09-18

## 2022-09-18 RX ORDER — INSULIN GLARGINE 100 [IU]/ML
35 INJECTION, SOLUTION SUBCUTANEOUS EVERY MORNING
Refills: 0 | Status: DISCONTINUED | OUTPATIENT
Start: 2022-09-19 | End: 2022-09-19

## 2022-09-18 RX ORDER — CLOPIDOGREL BISULFATE 75 MG/1
75 TABLET, FILM COATED ORAL DAILY
Refills: 0 | Status: DISCONTINUED | OUTPATIENT
Start: 2022-09-18 | End: 2022-09-19

## 2022-09-18 RX ORDER — INSULIN LISPRO 100/ML
10 VIAL (ML) SUBCUTANEOUS
Refills: 0 | Status: DISCONTINUED | OUTPATIENT
Start: 2022-09-18 | End: 2022-09-19

## 2022-09-18 RX ORDER — INSULIN LISPRO 100/ML
8 VIAL (ML) SUBCUTANEOUS ONCE
Refills: 0 | Status: COMPLETED | OUTPATIENT
Start: 2022-09-18 | End: 2022-09-18

## 2022-09-18 RX ORDER — DEXTROSE 50 % IN WATER 50 %
15 SYRINGE (ML) INTRAVENOUS ONCE
Refills: 0 | Status: DISCONTINUED | OUTPATIENT
Start: 2022-09-18 | End: 2022-09-18

## 2022-09-18 RX ORDER — INSULIN LISPRO 100/ML
VIAL (ML) SUBCUTANEOUS AT BEDTIME
Refills: 0 | Status: DISCONTINUED | OUTPATIENT
Start: 2022-09-18 | End: 2022-09-19

## 2022-09-18 RX ORDER — DEXTROSE 50 % IN WATER 50 %
12.5 SYRINGE (ML) INTRAVENOUS ONCE
Refills: 0 | Status: DISCONTINUED | OUTPATIENT
Start: 2022-09-18 | End: 2022-09-18

## 2022-09-18 RX ORDER — INSULIN GLARGINE 100 [IU]/ML
35 INJECTION, SOLUTION SUBCUTANEOUS AT BEDTIME
Refills: 0 | Status: DISCONTINUED | OUTPATIENT
Start: 2022-09-18 | End: 2022-09-18

## 2022-09-18 RX ORDER — GLUCAGON INJECTION, SOLUTION 0.5 MG/.1ML
1 INJECTION, SOLUTION SUBCUTANEOUS ONCE
Refills: 0 | Status: DISCONTINUED | OUTPATIENT
Start: 2022-09-18 | End: 2022-09-18

## 2022-09-18 RX ORDER — INSULIN LISPRO 100/ML
VIAL (ML) SUBCUTANEOUS AT BEDTIME
Refills: 0 | Status: DISCONTINUED | OUTPATIENT
Start: 2022-09-18 | End: 2022-09-18

## 2022-09-18 RX ORDER — SODIUM CHLORIDE 9 MG/ML
1000 INJECTION, SOLUTION INTRAVENOUS
Refills: 0 | Status: DISCONTINUED | OUTPATIENT
Start: 2022-09-18 | End: 2022-09-18

## 2022-09-18 RX ORDER — DEXTROSE 50 % IN WATER 50 %
15 SYRINGE (ML) INTRAVENOUS ONCE
Refills: 0 | Status: DISCONTINUED | OUTPATIENT
Start: 2022-09-18 | End: 2022-09-19

## 2022-09-18 RX ORDER — GLUCAGON INJECTION, SOLUTION 0.5 MG/.1ML
1 INJECTION, SOLUTION SUBCUTANEOUS ONCE
Refills: 0 | Status: DISCONTINUED | OUTPATIENT
Start: 2022-09-18 | End: 2022-09-19

## 2022-09-18 RX ORDER — DEXTROSE 50 % IN WATER 50 %
12.5 SYRINGE (ML) INTRAVENOUS ONCE
Refills: 0 | Status: DISCONTINUED | OUTPATIENT
Start: 2022-09-18 | End: 2022-09-19

## 2022-09-18 RX ADMIN — Medication 10: at 17:55

## 2022-09-18 RX ADMIN — GABAPENTIN 300 MILLIGRAM(S): 400 CAPSULE ORAL at 15:29

## 2022-09-18 RX ADMIN — Medication 1 TABLET(S): at 10:07

## 2022-09-18 RX ADMIN — Medication 81 MILLIGRAM(S): at 10:07

## 2022-09-18 RX ADMIN — Medication 12: at 12:30

## 2022-09-18 RX ADMIN — Medication 6 UNIT(S): at 12:30

## 2022-09-18 RX ADMIN — Medication 8: at 21:41

## 2022-09-18 RX ADMIN — ATORVASTATIN CALCIUM 80 MILLIGRAM(S): 80 TABLET, FILM COATED ORAL at 21:29

## 2022-09-18 RX ADMIN — Medication 25 MILLIGRAM(S): at 10:07

## 2022-09-18 RX ADMIN — Medication 25 MILLIGRAM(S): at 21:27

## 2022-09-18 RX ADMIN — GABAPENTIN 300 MILLIGRAM(S): 400 CAPSULE ORAL at 21:27

## 2022-09-18 RX ADMIN — ESCITALOPRAM OXALATE 10 MILLIGRAM(S): 10 TABLET, FILM COATED ORAL at 10:09

## 2022-09-18 RX ADMIN — INSULIN GLARGINE 40 UNIT(S): 100 INJECTION, SOLUTION SUBCUTANEOUS at 22:32

## 2022-09-18 RX ADMIN — Medication 6 UNIT(S): at 17:56

## 2022-09-18 RX ADMIN — GABAPENTIN 300 MILLIGRAM(S): 400 CAPSULE ORAL at 06:50

## 2022-09-18 RX ADMIN — CLOPIDOGREL BISULFATE 75 MILLIGRAM(S): 75 TABLET, FILM COATED ORAL at 12:31

## 2022-09-18 RX ADMIN — ENOXAPARIN SODIUM 40 MILLIGRAM(S): 100 INJECTION SUBCUTANEOUS at 21:26

## 2022-09-18 RX ADMIN — INSULIN GLARGINE 40 UNIT(S): 100 INJECTION, SOLUTION SUBCUTANEOUS at 08:24

## 2022-09-18 RX ADMIN — LOSARTAN POTASSIUM 100 MILLIGRAM(S): 100 TABLET, FILM COATED ORAL at 10:08

## 2022-09-18 RX ADMIN — Medication 6: at 08:15

## 2022-09-18 RX ADMIN — Medication 8 UNIT(S): at 00:35

## 2022-09-18 NOTE — CONSULT NOTE ADULT - ATTENDING COMMENTS
62 year old female with right ICA stenosis at bifurcation; patient with syncope so this is asymptomatic stenosis given syncope is a symptom of posterior circulation and her vertebral arteries appear disease free on MRA     Plan:  Please obtain carotid artery duplex to better evaluate degree of stenosis  Recommend Aspirin, statin  Will follow, currently no indication for surgical intervention at this time

## 2022-09-18 NOTE — CONSULT NOTE ADULT - ASSESSMENT
62 year old female presents with right 80-90% CCA stenosis. Possible cause of her syncope. Echo unremarkable.    Plan:  Aspirin, statins  Glucose control      Further Plan pending discussion with Dr. Garcia. 62 year old female presents with right 80-90% CCA stenosis. Possible cause of her syncope; could have been a TIA. Echo unremarkable.    Plan:  Recommend Aspirin, Plavix, statin  Strict glucose control      Further Plan pending discussion with Dr. Garcia. 62 year old female with right ICA stenosis at bifurcation; patient with syncope so this is asymptomatic stenosis given syncope is a symptom of posterior circulation and her vertebral arteries appear disease free on MRA     Plan:  Please obtain carotid artery duplex to better evaluate degree of stenosis  Recommend Aspirin, statin        .

## 2022-09-18 NOTE — CONSULT NOTE ADULT - CONSULT REASON
Please evaluate patient admitted for syncope, vertigo, found to have on MRA high-grade (80-90%) stenosis at the distal R CCA immediately prior to the bifurcation, high-grade (70-80%) stenosis at the origin of the R ICA.
syncope

## 2022-09-18 NOTE — CONSULT NOTE ADULT - SUBJECTIVE AND OBJECTIVE BOX
Hx obtained from son and patient. Romanian  ID: [id number]  62 year old female presents with syncope 1 day ago. She states that she was watching tv when she suddenly fell over, did not hit her head. According to family, she was fainted for a couple of seconds and when she woke up she was not confused. She denies vision changes, weakness, headache, or chest pain. She is able to walk on her own with no difficulty or SOB. She denies previous hx of TIA or stroke.    ROS neg except as above    PMH: HTN, CAD s/p stents (6-7 years ago at [place]), HTN, DM, MDD  Allergies: None  Meds: as per chart  PSH: rt ankle surgery , lt lung surgery for pneumonia , cataract surgery  Sohx: no tobacco, etoh, or illicit drug use    Vitals:  T(C): 36.7 ( @ 07:22), Max: 36.7 ( @ 20:18)  HR: 56 ( @ 07:22) (56 - 67)  BP: 121/42 ( @ 07:22) (121/42 - 155/60)  RR: 18 ( @ 07:22) (17 - 18)  SpO2: 98% ( @ 07:22) (97% - 98%)    Physical Exam:  General: AAOx3, Well developed, NAD  Neck: no scar, 2+ left carotid artery pulse, 1+ on the right  Chest: Normal respiratory effort, lest chest scar and smaller scar adjacent  Heart: RRR  Abdomen: Soft, NTND, no masses  Neuro/Psych: No localized deficits. Normal speech, normal tone  Skin: Normal, no rashes, no lesions noted.   Extremities: Warm, well perfused, no edema, Pulses intact     @ 06:33                    -  CBC: ->)-------(<-                     -                 135 | 102 | 17    CMP:  ----------------------< 259               4.3 | 30 | 0.91                      Ca:9.0  Phos:-  Mg:-               -|      |-        LFTs:  ------|-|-----             -|      |-   @ 17:31                    -  CBC: ->)-------(<-                     -                 - | - | -    CMP:  ----------------------< 137               - | - | -                      Ca:-  Phos:-  Mg:-               -|      |-        LFTs:  ------|-|-----             -|      |-   @ 08:05                    -  CBC: ->)-------(<-                     -                 142 | 108 | 13    CMP:  ----------------------< 146               4.2 | 30 | 0.76                      Ca:9.3  Phos:-  M.8               -|      |-        LFTs:  ------|-|-----             -|      |-      Culture - Urine (collected 22 @ 05:20)  Source: Clean Catch Clean Catch (Midstream)  Final Report (22 @ 08:32):    <10,000 CFU/mL Normal Urogenital [place]      Current Inpatient Medications:  aspirin  chewable 81 milliGRAM(s) Oral daily  atorvastatin 80 milliGRAM(s) Oral at bedtime  clopidogrel Tablet 75 milliGRAM(s) Oral daily  dextrose 5%. 1000 milliLiter(s) (100 mL/Hr) IV Continuous <Continuous>  dextrose 5%. 1000 milliLiter(s) (50 mL/Hr) IV Continuous <Continuous>  dextrose 50% Injectable 25 Gram(s) IV Push once  dextrose 50% Injectable 12.5 Gram(s) IV Push once  dextrose 50% Injectable 25 Gram(s) IV Push once  dextrose Oral Gel 15 Gram(s) Oral once PRN  enoxaparin Injectable 40 milliGRAM(s) SubCutaneous every 24 hours  escitalopram 10 milliGRAM(s) Oral daily  gabapentin 300 milliGRAM(s) Oral three times a day  glucagon  Injectable 1 milliGRAM(s) IntraMuscular once  influenza   Vaccine 0.5 milliLiter(s) IntraMuscular once  insulin glargine Injectable (LANTUS) 35 Unit(s) SubCutaneous at bedtime  insulin lispro (ADMELOG) corrective regimen sliding scale   SubCutaneous three times a day before meals  insulin lispro (ADMELOG) corrective regimen sliding scale   SubCutaneous at bedtime  insulin lispro Injectable (ADMELOG) 6 Unit(s) SubCutaneous three times a day before meals  losartan 100 milliGRAM(s) Oral daily  meclizine 25 milliGRAM(s) Oral every 8 hours PRN  metoprolol tartrate 25 milliGRAM(s) Oral two times a day  multivitamin 1 Tablet(s) Oral daily          
PCP:    REQUESTING PHYSICIAN:    REASON FOR CONSULT:    CHIEF COMPLAINT:    HPI:  Patient is a 63 y/o Welsh speaking female with a PMH of CAD s/p 2 stents (placed in [facility] 6-7 years ago), HTN, HLD, T2DM (complicated by neuropathy), and depression presenting to the ED at the insistence of her family after she fainted the day prior to admission. Before she fainted she was resting on the sofa watching her granddaughter play when she suddenly felt dizzy and nauseous and then fell off the sofa. The fall was witnessed by her daughter-in-law. Immediately after the episode she reports sweating excessively and her body feeling cold. This is the first time she experienced such an event. At the time of the incident she denied having chest pain, palpitations, shortness of breath, or loss of vision. She checked her blood glucose around the same time and reports it was 133. She mentions that since the past 3 days prior to admission she had been feeling dizzy. This prompted her to check her blood pressure (she usually does not regularly check her blood pressure at home). She noted having elevated readings at home despite being compliant with her home blood pressure medication. Reported having systolic values in the 160-170 range; does not recall the diastolic values but says prior to her syncopal episode her BP reading was 175/102. She does not report being in pain or stress lately.  She stays well hydrated throughout the day. She denies any further episodes since yesterday.     Upon arrival to the ED, vitals were stable. /58 HR 57 RR 16 SpO2 96% on RA. No significant labs. CT head did not show any acute infarct, hemorrhage or space-occupying lesion.  (16 Sep 2022 00:41)  Cardiology requested to evaluate syncopal episode. History as above. She denies exertional symptoms prior to event. She is followed by a physician in [place] and reports no acute issues. Conversation was performed through .       PAST MEDICAL & SURGICAL HISTORY:  Diabetes mellitus type 2 in nonobese      Stented coronary artery  x2      HTN (hypertension)      High cholesterol      Neuropathy      Depression      History of coronary artery stent placement  x2          Allergies    No Known Allergies    Intolerances        SOCIAL HISTORY:    FAMILY HISTORY:      MEDICATIONS:  MEDICATIONS  (STANDING):  aspirin  chewable 81 milliGRAM(s) Oral daily  atorvastatin 80 milliGRAM(s) Oral at bedtime  dextrose 5%. 1000 milliLiter(s) (50 mL/Hr) IV Continuous <Continuous>  dextrose 5%. 1000 milliLiter(s) (100 mL/Hr) IV Continuous <Continuous>  dextrose 50% Injectable 25 Gram(s) IV Push once  dextrose 50% Injectable 12.5 Gram(s) IV Push once  dextrose 50% Injectable 25 Gram(s) IV Push once  escitalopram 10 milliGRAM(s) Oral daily  gabapentin 300 milliGRAM(s) Oral three times a day  glucagon  Injectable 1 milliGRAM(s) IntraMuscular once  influenza   Vaccine 0.5 milliLiter(s) IntraMuscular once  insulin glargine Injectable (LANTUS) 40 Unit(s) SubCutaneous two times a day  insulin lispro (ADMELOG) corrective regimen sliding scale   SubCutaneous three times a day before meals  insulin lispro (ADMELOG) corrective regimen sliding scale   SubCutaneous at bedtime  insulin lispro Injectable (ADMELOG) 20 Unit(s) SubCutaneous three times a day before meals  losartan 100 milliGRAM(s) Oral daily  metoprolol tartrate 25 milliGRAM(s) Oral two times a day  multivitamin 1 Tablet(s) Oral daily    MEDICATIONS  (PRN):  dextrose Oral Gel 15 Gram(s) Oral once PRN Blood Glucose LESS THAN 70 milliGRAM(s)/deciliter      REVIEW OF SYSTEMS:    CONSTITUTIONAL: No weakness, fevers or chills  EYES/ENT: No visual changes;  No vertigo or throat pain   NECK: No pain or stiffness  RESPIRATORY: No cough, wheezing, hemoptysis; No shortness of breath  CARDIOVASCULAR: No chest pain or palpitations  GASTROINTESTINAL: No abdominal or epigastric pain. No nausea, vomiting, or hematemesis; No diarrhea or constipation. No melena or hematochezia.  GENITOURINARY: No dysuria, frequency or hematuria  NEUROLOGICAL: Syncope  SKIN: No itching, burning, rashes, or lesions   All other review of systems is negative unless indicated above    Vital Signs Last 24 Hrs  T(C): 36.4 (16 Sep 2022 07:42), Max: 37.1 (16 Sep 2022 05:18)  T(F): 97.6 (16 Sep 2022 07:42), Max: 98.7 (16 Sep 2022 05:18)  HR: 57 (16 Sep 2022 05:18) (57 - 77)  BP: 157/- (16 Sep 2022 05:18) (123/58 - 162/74)  BP(mean): 58 (16 Sep 2022 05:18) (58 - 98)  RR: 18 (16 Sep 2022 07:42) (16 - 18)  SpO2: 98% (16 Sep 2022 07:42) (96% - 99%)    Parameters below as of 16 Sep 2022 07:42  Patient On (Oxygen Delivery Method): room air        I&O's Summary      PHYSICAL EXAM:    Constitutional: NAD, awake and alert, well-developed  HEENT: PERR, EOMI,  No oral cyananosis.  Neck:  supple,  No JVD  Respiratory: Breath sounds are clear bilaterally, No wheezing, rales or rhonchi  Cardiovascular: S1 and S2, regular rate and rhythm, no Murmurs, gallops or rubs  Gastrointestinal: Bowel Sounds present, soft, nontender.   Extremities: No peripheral edema. No clubbing or cyanosis.  Vascular: 2+ peripheral pulses  Neurological: A/O x 3, no focal deficits  Musculoskeletal: no calf tenderness.  Skin: No rashes.      LABS: All Labs Reviewed:                        10.2   7.26  )-----------( 210      ( 16 Sep 2022 05:23 )             30.6     16 Sep 2022 05:23    138    |  105    |  20     ----------------------------<  154    4.7     |  31     |  0.87   15 Sep 2022 18:53    136    |  104    |  23     ----------------------------<  250    4.5     |  28     |  1.06     Ca    9.1        16 Sep 2022 05:23  Ca    9.2        15 Sep 2022 18:53  Phos  3.8       16 Sep 2022 05:23  Mg     1.5       16 Sep 2022 05:23    TPro  7.3    /  Alb  3.7    /  TBili  0.5    /  DBili  x      /  AST  21     /  ALT  30     /  AlkPhos  65     15 Sep 2022 18:53    PT/INR - ( 16 Sep 2022 05:23 )   PT: 11.3 sec;   INR: 0.97 ratio         PTT - ( 16 Sep 2022 05:23 )  PTT:30.1 sec      Blood Culture:   09-15 @ 18:53  Pro Bnp 157    09-16 @ 05:23  TSH: 5.03      RADIOLOGY/EKG: < from: 12 Lead ECG (09.15.22 @ 18:03) >  Diagnosis Line Sinus rhythm with occasional Premature ventricular complexes  Nonspecific T wave abnormality  Abnormal ECG  Confirmed by GASTON MAJOR, ANDERSON ([id number]) on 9/16/2022 6:39:07 AM    < end of copied text >

## 2022-09-19 ENCOUNTER — TRANSCRIPTION ENCOUNTER (OUTPATIENT)
Age: 62
End: 2022-09-19

## 2022-09-19 VITALS
OXYGEN SATURATION: 100 % | RESPIRATION RATE: 20 BRPM | SYSTOLIC BLOOD PRESSURE: 133 MMHG | DIASTOLIC BLOOD PRESSURE: 53 MMHG | TEMPERATURE: 98 F | HEART RATE: 57 BPM

## 2022-09-19 LAB
ANION GAP SERPL CALC-SCNC: 1 MMOL/L — LOW (ref 5–17)
APTT BLD: 32.1 SEC — SIGNIFICANT CHANGE UP (ref 27.5–35.5)
BASOPHILS # BLD AUTO: 0.08 K/UL — SIGNIFICANT CHANGE UP (ref 0–0.2)
BASOPHILS NFR BLD AUTO: 1.2 % — SIGNIFICANT CHANGE UP (ref 0–2)
BUN SERPL-MCNC: 13 MG/DL — SIGNIFICANT CHANGE UP (ref 7–23)
CALCIUM SERPL-MCNC: 9.1 MG/DL — SIGNIFICANT CHANGE UP (ref 8.5–10.1)
CHLORIDE SERPL-SCNC: 107 MMOL/L — SIGNIFICANT CHANGE UP (ref 96–108)
CO2 SERPL-SCNC: 33 MMOL/L — HIGH (ref 22–31)
CREAT SERPL-MCNC: 0.75 MG/DL — SIGNIFICANT CHANGE UP (ref 0.5–1.3)
EGFR: 91 ML/MIN/1.73M2 — SIGNIFICANT CHANGE UP
EOSINOPHIL # BLD AUTO: 0.35 K/UL — SIGNIFICANT CHANGE UP (ref 0–0.5)
EOSINOPHIL NFR BLD AUTO: 5.2 % — SIGNIFICANT CHANGE UP (ref 0–6)
GLUCOSE SERPL-MCNC: 195 MG/DL — HIGH (ref 70–99)
HCT VFR BLD CALC: 31.4 % — LOW (ref 34.5–45)
HGB BLD-MCNC: 10.2 G/DL — LOW (ref 11.5–15.5)
IMM GRANULOCYTES NFR BLD AUTO: 0.1 % — SIGNIFICANT CHANGE UP (ref 0–0.9)
INR BLD: 1.05 RATIO — SIGNIFICANT CHANGE UP (ref 0.88–1.16)
LYMPHOCYTES # BLD AUTO: 3.56 K/UL — HIGH (ref 1–3.3)
LYMPHOCYTES # BLD AUTO: 52.7 % — HIGH (ref 13–44)
MCHC RBC-ENTMCNC: 28 PG — SIGNIFICANT CHANGE UP (ref 27–34)
MCHC RBC-ENTMCNC: 32.5 GM/DL — SIGNIFICANT CHANGE UP (ref 32–36)
MCV RBC AUTO: 86.3 FL — SIGNIFICANT CHANGE UP (ref 80–100)
MONOCYTES # BLD AUTO: 0.37 K/UL — SIGNIFICANT CHANGE UP (ref 0–0.9)
MONOCYTES NFR BLD AUTO: 5.5 % — SIGNIFICANT CHANGE UP (ref 2–14)
NEUTROPHILS # BLD AUTO: 2.38 K/UL — SIGNIFICANT CHANGE UP (ref 1.8–7.4)
NEUTROPHILS NFR BLD AUTO: 35.3 % — LOW (ref 43–77)
PLATELET # BLD AUTO: 193 K/UL — SIGNIFICANT CHANGE UP (ref 150–400)
POTASSIUM SERPL-MCNC: 4.3 MMOL/L — SIGNIFICANT CHANGE UP (ref 3.5–5.3)
POTASSIUM SERPL-SCNC: 4.3 MMOL/L — SIGNIFICANT CHANGE UP (ref 3.5–5.3)
PROTHROM AB SERPL-ACNC: 12.2 SEC — SIGNIFICANT CHANGE UP (ref 10.5–13.4)
RBC # BLD: 3.64 M/UL — LOW (ref 3.8–5.2)
RBC # FLD: 13.1 % — SIGNIFICANT CHANGE UP (ref 10.3–14.5)
SODIUM SERPL-SCNC: 141 MMOL/L — SIGNIFICANT CHANGE UP (ref 135–145)
WBC # BLD: 6.75 K/UL — SIGNIFICANT CHANGE UP (ref 3.8–10.5)
WBC # FLD AUTO: 6.75 K/UL — SIGNIFICANT CHANGE UP (ref 3.8–10.5)

## 2022-09-19 PROCEDURE — 93880 EXTRACRANIAL BILAT STUDY: CPT | Mod: 26

## 2022-09-19 PROCEDURE — 99239 HOSP IP/OBS DSCHRG MGMT >30: CPT

## 2022-09-19 RX ORDER — SODIUM CHLORIDE 9 MG/ML
500 INJECTION INTRAMUSCULAR; INTRAVENOUS; SUBCUTANEOUS ONCE
Refills: 0 | Status: COMPLETED | OUTPATIENT
Start: 2022-09-19 | End: 2022-09-19

## 2022-09-19 RX ORDER — CLOPIDOGREL BISULFATE 75 MG/1
1 TABLET, FILM COATED ORAL
Qty: 30 | Refills: 0
Start: 2022-09-19

## 2022-09-19 RX ADMIN — Medication 81 MILLIGRAM(S): at 09:43

## 2022-09-19 RX ADMIN — GABAPENTIN 300 MILLIGRAM(S): 400 CAPSULE ORAL at 07:58

## 2022-09-19 RX ADMIN — Medication 10 UNIT(S): at 12:02

## 2022-09-19 RX ADMIN — LOSARTAN POTASSIUM 100 MILLIGRAM(S): 100 TABLET, FILM COATED ORAL at 09:43

## 2022-09-19 RX ADMIN — Medication 25 MILLIGRAM(S): at 09:43

## 2022-09-19 RX ADMIN — GABAPENTIN 300 MILLIGRAM(S): 400 CAPSULE ORAL at 13:13

## 2022-09-19 RX ADMIN — ESCITALOPRAM OXALATE 10 MILLIGRAM(S): 10 TABLET, FILM COATED ORAL at 09:43

## 2022-09-19 RX ADMIN — Medication 10 UNIT(S): at 07:53

## 2022-09-19 RX ADMIN — Medication 2: at 12:02

## 2022-09-19 RX ADMIN — INSULIN GLARGINE 35 UNIT(S): 100 INJECTION, SOLUTION SUBCUTANEOUS at 07:56

## 2022-09-19 RX ADMIN — Medication 1 TABLET(S): at 09:44

## 2022-09-19 RX ADMIN — SODIUM CHLORIDE 500 MILLILITER(S): 9 INJECTION INTRAMUSCULAR; INTRAVENOUS; SUBCUTANEOUS at 06:31

## 2022-09-19 RX ADMIN — CLOPIDOGREL BISULFATE 75 MILLIGRAM(S): 75 TABLET, FILM COATED ORAL at 09:44

## 2022-09-19 RX ADMIN — Medication 2: at 07:53

## 2022-09-19 NOTE — PROGRESS NOTE ADULT - ASSESSMENT
62 year old female with right ICA stenosis at bifurcation.  This is an asymptomatic stenosis given syncope is a symptom of posterior circulation.    Plan:  Carotid duplex study reviewed  Recommend Aspirin, Statin  No acute vascular surgery intervention is warranted at this time  Please continue with syncope work up  Medical management as per primary team  Please have pt follow up with Dr. Garcia as an outpatient for eventual right carotid endarterectomy    Plan discussed with Dr. Garcia
62 year old woman with DM, HTN, HLD, CAD, hx of PCI x 2, depression, presented for further evaluation after an episode of syncope. Patient was feeling generally unwell the preceding day to two with a bit of malaise, mild nausea. No fever or chills. No chest pain or shortness of breath. No cough. No abdominal pain, vomiting, diarrhea or dysuria. No rash. No synovitis. On the day of presentation she was quite hypertensive, /102, had a bit of a headache, some lightheadedness. She was seated on the cough and passed out briefly, a min or so, and came to in a sweat. No seizure activity seen. In the ED, vitals were normal. Exam was unremarkable. Labs also were unrevealing. She was placed on telemetry and triaged to [facility].     Syncope  Initial thought to be vasovagal or orthostatic (was orthostatic on 9/16). Got 1L NS IV fluids and orthostasis has resolved. No events on tele. No signs of acute coronary syndrome. No signs of heart failure. No evidence to suggest PE. No signs or symptoms of infection. Neurologically she is fully intact but she is reporting vertigo. No focal neurological deficits or other neurological symptoms. Does have risk factors for stroke with DM, uncontrolled HTN.  - Ordered for MR brain, MRA head and neck     Type II DM  A1c 7.0. On Lantus and Lispro. Glucose 100s today but this evening pre-dinner was hypoglycemic, given glucose, dinner meal and glucose improved.   - Continue Lantus but will switch from BID to once daily AM (thus holding tonight's lantus), continue Lispro correctional scale  - Continue to monitor glucose TID AC and HS and adjust regimen as needed    HTN  BP uncontrolled at home but now improved. On metoprolol and losartan  - Continue metoprolol and losartan    HLD  Stable  - Continue statin    Depression  Stable  - Continue Lexapro      Dispo: Anticipate DC home 9/18 if feeling improved and inpatient workup is complete
62 year old woman with DM, HTN, HLD, CAD, hx of PCI x 2, depression, presented for further evaluation after a fainting spell. Patient was feeling generally unwell the preceding day to two with a bit of malaise, mild nausea. No fever or chills. No chest pain or shortness of breath. No cough. No abdominal pain, vomiting, diarrhea or dysuria. No rash. No synovitis. On the day of presentation she was quite hypertensive, /102, had a bit of a headache, some lightheadedness. She was seated on the cough and passed out briefly, a min or so, and came to in a sweat. No seizure activity seen. In the ED, vitals were normal. Exam was unremarkable. Labs also were unrevealing. She was placed on telemetry and triaged to [facility].     Syncope  Likely vasovagal. Patient was orthostatic on assessment today. Ordered for 1L NS IV fluids. No events on tele. No signs of acute coronary syndrome. No signs of heart failure. No evidence to suggest PE. No signs or symptoms of infection. Neurologically she is fully intact.   - Give trial of IV fluid hydration  - Ambulate patient to assess gait stability etc  - TTE for completeness    Type II DM  a1c 7.0. On Lantus and Lispro. Glucose 100s today.   - Continue Lantus and Lispro  - Continue to monitor glucose TID AC and HS and adjust regimen as needed    HTN  BP well controlled. On metoprolol and losartan  - Continue metoprolol and losartan    HLD  Stable  - Continue statin    Depression  Stable  - Continue Lexapro      Dispo: Anticipate DC home 9/17 if feeling improved and inpatient workup is complete
62 year old woman with DM, HTN, HLD, CAD, hx of PCI x 2, depression, presented for further evaluation after an episode of syncope. Patient was feeling generally unwell the preceding day to two with a bit of malaise, mild nausea. No fever or chills. No chest pain or shortness of breath. No cough. No abdominal pain, vomiting, diarrhea or dysuria. No rash. No synovitis. On the day of presentation she was quite hypertensive, /102, had a bit of a headache, some lightheadedness. She was seated on the cough and passed out briefly, a min or so, and came to in a sweat. No seizure activity seen. In the ED, vitals were normal. Exam was unremarkable. Labs also were unrevealing. She was placed on telemetry and triaged to [facility].     Syncope  Initial thought to be vasovagal or orthostatic (was orthostatic on 9/16). Got 1L NS IV fluids and orthostasis has resolved. No events on tele. No signs of acute coronary syndrome. No signs of heart failure. No evidence to suggest PE. No signs or symptoms of infection. Neurologically she is fully intact but she was reporting vertigo 9/17. No focal neurological deficits or other neurological symptoms. Does have risk factors for stroke with DM, uncontrolled HTN. Thus, she underwent MR brain, negative for acute infarction. No mass, hemorrhage or other findings other than mild periventricular white matter ischemia. MRA of the head was also unremarkable. MRA of the neck however did show high grade stenosis (See below)  - Management of underlying issues at this point, see below    High grade R carotid stenosis  MRA neck 9/17 with findings of high-grade (80-90%) stenosis at the distal R CCA immediately prior to the bifurcation. High-grade (70-80%) stenosis at the origin of the R internal carotid artery. L carotid system with no hemodynamically significant stenosis. Already on aspirin, high dose statin. Added clopidogrel.  - Continue aspirin, clopidogrel, statin  - Consulting Vascular Surgery    Type II DM  A1c 7.0. On Lantus and Lispro. Glucose uncontrolled today with marked hyperglycemia, likely due to missed insulin in setting of yesterdays hypoglycemia.   - Continue Lantus but will adjust to 35 units AM and PM. Adjust Lispro to 10 units with meals plus correctional scale  - Continue to monitor glucose TID AC and HS and adjust regimen as needed    HTN  BP uncontrolled at home but now improved. On metoprolol and losartan  - Continue metoprolol and losartan    HLD  Stable  - Continue statin    Depression  Stable  - Continue Lexapro      Dispo: Anticipate DC home once inpatient workup is complete, awaiting guidance from Vascular Surgery  
immune

## 2022-09-19 NOTE — DISCHARGE NOTE PROVIDER - CARE PROVIDER_API CALL
Fortino Garcia)  Surgery Vascular  [address] [place] [id number]  Phone: [phone]  Fax: [phone]  Follow Up Time: 2 weeks

## 2022-09-19 NOTE — PROGRESS NOTE ADULT - SUBJECTIVE AND OBJECTIVE BOX
Chief Complaint: Syncope    Interval Hx: Patient seen and examined. Communicated with patient utilizing [employer] (Language: Latvian). Patient states that she feels well. She essentially has no complaints at this time. Resting comfortably. Has been up, out of bed. No dizziness or lightheadedness. No palpitations. No chest pain or tightness. No shortness of breath. No abdominal complaints or genitourinary complaints. She did have orthostatic vital signs checked and these were positive. Thereafter, she was ordered for a 1L bolus of normal saline.    ROS: Multi system review is comprehensively negative x 10 systems     Vitals:  T(F): 97.6 (16 Sep 2022 07:42), Max: 98.7 (16 Sep 2022 05:18)  HR: 57 - 77  BP: 123/58 - 162/74  RR: 16 - 18  SpO2: 96% - 99% on room air    Exam:  Gen: Comfortable appearing  HEENT: NCAT PERRL EOMI MMM clear oropharynx  Neck: Supple, no JVD, no LAD  Chest: Normal resp effort, lungs CTA B/L  CVS: s1 s2 normal, RRR  Abd: +BS, soft NT ND   Ext: No edema or calf tenderness  Skin: Warm, dry, intact  Neuro: A+OX3, speech fluent, no aphasia, repetition and naming intact, CN intact, no drift, strength 5/5 UEs and LEs B/L, sensation intact to pin prick, reflexes normal and symmetric, plantars downgoing, no dysmetria, gait not tested  Mood: Calm and pleasant    Labs:                       10.2   7.26 )-----------( 210             30.6       138  |  105  |  20  -----------------------< 154  4.7   |   31   |  0.87    Ca 9.1  Phos 3.8   Mg 1.5    TPro  7.3  /  Alb  3.7  /  TBili  0.5  /  DBili  x   /  AST  21  /  ALT  30  /  AlkPhos  65      Troponin negative x 2   proBNP WNL   TSH 5.03   A1c 7.0    Urinalysis 9/16: Clear, ket-, prob-, N-, LE-, o WBC, o RBC, o bacteria    Micro:    Imaging:    Cardiac Testing:    Meds:  MEDICATIONS  (STANDING):  aspirin  chewable 81 milliGRAM(s) Oral daily  atorvastatin 80 milliGRAM(s) Oral at bedtime  escitalopram 10 milliGRAM(s) Oral daily  gabapentin 300 milliGRAM(s) Oral three times a day  influenza   Vaccine 0.5 milliLiter(s) IntraMuscular once  insulin glargine Injectable (LANTUS) 40 Unit(s) SubCutaneous two times a day  insulin lispro (ADMELOG) corrective regimen sliding scale   SubCutaneous three times a day before meals  insulin lispro (ADMELOG) corrective regimen sliding scale   SubCutaneous at bedtime  insulin lispro Injectable (ADMELOG) 20 Unit(s) SubCutaneous three times a day before meals  losartan 100 milliGRAM(s) Oral daily  metoprolol tartrate 25 milliGRAM(s) Oral two times a day  multivitamin 1 Tablet(s) Oral daily    MEDICATIONS  (PRN):  dextrose Oral Gel 15 Gram(s) Oral once PRN Blood Glucose LESS THAN 70 milliGRAM(s)/deciliter                  
Patient was seen and evaluated at bedside. No acute events overnight. VS reviewed.     Vital Signs Last 24 Hrs  T(C): 36.5 (19 Sep 2022 07:30), Max: 36.7 (18 Sep 2022 18:56)  T(F): 97.7 (19 Sep 2022 07:30), Max: 98 (18 Sep 2022 18:56)  HR: 57 (19 Sep 2022 07:30) (57 - 69)  BP: 133/53 (19 Sep 2022 07:30) (133/53 - 134/53)  BP(mean): --  RR: 20 (19 Sep 2022 07:30) (19 - 20)  SpO2: 100% (19 Sep 2022 07:30) (97% - 100%)    Parameters below as of 19 Sep 2022 07:30  Patient On (Oxygen Delivery Method): room air    Physical Exam:  General: AAOx3, Well developed, NAD  Neck: no scar, 2+ left carotid artery pulse, 1+ on the right  Chest: Normal respiratory effort, lest chest scar and smaller scar adjacent  Heart: RRR  Abdomen: Soft, NTND, no masses  Neuro/Psych: No localized deficits. Normal speech, normal tone  Skin: Normal, no rashes, no lesions noted.   Extremities: Warm, well perfused, no edema, Pulses intact    I&O's Summary    I&O's Detail      MEDICATIONS  (STANDING):  aspirin  chewable 81 milliGRAM(s) Oral daily  atorvastatin 80 milliGRAM(s) Oral at bedtime  clopidogrel Tablet 75 milliGRAM(s) Oral daily  dextrose 5%. 1000 milliLiter(s) (50 mL/Hr) IV Continuous <Continuous>  dextrose 5%. 1000 milliLiter(s) (100 mL/Hr) IV Continuous <Continuous>  dextrose 50% Injectable 25 Gram(s) IV Push once  dextrose 50% Injectable 12.5 Gram(s) IV Push once  dextrose 50% Injectable 25 Gram(s) IV Push once  enoxaparin Injectable 40 milliGRAM(s) SubCutaneous every 24 hours  escitalopram 10 milliGRAM(s) Oral daily  gabapentin 300 milliGRAM(s) Oral three times a day  glucagon  Injectable 1 milliGRAM(s) IntraMuscular once  influenza   Vaccine 0.5 milliLiter(s) IntraMuscular once  insulin glargine Injectable (LANTUS) 35 Unit(s) SubCutaneous every morning  insulin glargine Injectable (LANTUS) 35 Unit(s) SubCutaneous at bedtime  insulin lispro (ADMELOG) corrective regimen sliding scale   SubCutaneous three times a day before meals  insulin lispro (ADMELOG) corrective regimen sliding scale   SubCutaneous at bedtime  insulin lispro Injectable (ADMELOG) 10 Unit(s) SubCutaneous three times a day before meals  losartan 100 milliGRAM(s) Oral daily  metoprolol tartrate 25 milliGRAM(s) Oral two times a day  multivitamin 1 Tablet(s) Oral daily    MEDICATIONS  (PRN):  dextrose Oral Gel 15 Gram(s) Oral once PRN Blood Glucose LESS THAN 70 milliGRAM(s)/deciliter  meclizine 25 milliGRAM(s) Oral every 8 hours PRN Dizziness      LABS:                        10.2   6.75  )-----------( 193      ( 19 Sep 2022 06:24 )             31.4     09-19    141  |  107  |  13  ----------------------------<  195<H>  4.3   |  33<H>  |  0.75    Ca    9.1      19 Sep 2022 06:24      PT/INR - ( 19 Sep 2022 06:24 )   PT: 12.2 sec;   INR: 1.05 ratio         PTT - ( 19 Sep 2022 06:24 )  PTT:32.1 sec      RADIOLOGY & ADDITIONAL STUDIES:    
Chief Complaint: Syncope    Interval Hx: Patient seen and examined. Communicated with patient utilizing [employer] (Language: German). Patient reports that her vertigo has resolved. She is ambulating more steadily, feels generally well, eager to return home soon. No headache. No change in speech or vision. No focal weakness or numbness. No ear complaints such as pain or drainage or decreased hearing. Orthostatics are now negative. No palpitations. No chest pain or tightness. No shortness of breath. No abdominal complaints or genitourinary complaints. Given her vertigo yesterday and her risk factors for stroke (DM, HTN, elevated to 170s/100s + at home) MRI brain obtained, negative for acute stroke, did show some white matter ischemic changes. MRA was significant with up to 90% stenosis in R CCA and 80% stenosis in R ICA. Vascular Surgery Consulted. Additionally, after a hypoglycemic episode yesterday AM, her insulin was held temporarily and she is now hyperglycemic. Adjustments to insulin regimen made.     ROS: Multi system review is comprehensively negative x 10 systems except for vertigo    Vitals:  T(F): 98 (18 Sep 2022 07:22), Max: 98.1 (17 Sep 2022 20:18)  HR: 56 (18 Sep 2022 07:22) (56 - 67)  BP: 121/42 (18 Sep 2022 07:22) (121/42 - 155/60)  RR: 18 (18 Sep 2022 07:22) (17 - 18)  SpO2: 98% (18 Sep 2022 07:22) (97% - 98%) on room air    Exam:  Gen: Comfortable appearing  HEENT: NCAT PERRL EOMI MMM clear oropharynx  Neck: Supple, no JVD, no LAD  Chest: Normal resp effort, lungs CTA B/L  CVS: s1 s2 normal, RRR  Abd: +BS, soft NT ND   Ext: No edema or calf tenderness  Skin: Warm, dry, intact  Neuro: A+OX3, speech fluent, no aphasia, repetition and naming intact, CN intact, no drift, strength 5/5 UEs and LEs B/L, sensation intact to pin prick, reflexes normal and symmetric, plantars downgoing, no dysmetria, gait not tested  Mood: Calm and pleasant    Labs:                       10.2   7.26 )-----------( 210             30.6       135  |  102  |  17  ---------------------<  259  4.3   |  30  |  0.91    Ca 9.0   Mg 1.8    TPro  7.3  /  Alb  3.7  /  TBili  0.5  /  DBili  x   /  AST  21  /  ALT  30  /  AlkPhos  65      Troponin negative x 2   proBNP WNL   TSH 5.03   A1c 7.0    Urinalysis 9/16: Clear, ket-, prob-, N-, LE-, o WBC, o RBC, o bacteria    Micro:  COVID negative  Flu negative  RSV negative    Imaging:  MR brain 9/17: Mild periventricular white matter ischemia. No acute cerebral cortical infarct is found. No intracranial hemorrhage is recognized. No mass effect is found in the brain. The ventricles, sulci and basal cisterns appear unremarkable.     MRA head and neck 9/17: Unremarkable intracranial MRA. MRA neck with findings of high-grade (80-90%) stenosis at the distal R CCA immediately prior to the bifurcation. High-grade (70-80%) stenosis at the origin of the R internal carotid artery. L carotid system with no hemodynamically significant stenosis.    CT head 9/15: Involutional changes and volume loss are noted,, with no acute infarct, hemorrhage or space-occupying lesion. Midline with mild ex vacuo enlargement.  The brain stem and cerebellum are unremarkable.  No CP angle lesion noted. No subdural or epidural collections are noted. No fracture or destructive lesion is identified. Clear sinuses and mastoid. No orbital or suprasellar abnormality noted.    Cardiac Testing:  TTE 9/16: Left ventricle is normal in size, wall thickness, wall motion and contractility. Estimated left ventricular ejection fraction is 60-65 %. Normal appearing left atrium. Normal appearing right atrium. Normal appearing right ventricle structure and function. The aortic valve is well visualized, appears mildly calcified. Valve opening seems to be normal. The mitral valve leaflets appear calcified. EA reversal of the mitral inflow consistent with reduced compliance of the left ventricle. Trace mitral regurgitation is present. No evidence of pericardial effusion.    EKG 9/15: Rate WNL. Sinus rhythm with occasional premature ventricular complexes. Nonspecific T wave abnormality    Meds:  MEDICATIONS  (STANDING):  aspirin  chewable 81 milliGRAM(s) Oral daily  atorvastatin 80 milliGRAM(s) Oral at bedtime  clopidogrel Tablet 75 milliGRAM(s) Oral daily  enoxaparin Injectable 40 milliGRAM(s) SubCutaneous every 24 hours  escitalopram 10 milliGRAM(s) Oral daily  gabapentin 300 milliGRAM(s) Oral three times a day  influenza   Vaccine 0.5 milliLiter(s) IntraMuscular once  insulin glargine Injectable (LANTUS) 35 Unit(s) SubCutaneous at bedtime  insulin lispro (ADMELOG) corrective regimen sliding scale   SubCutaneous three times a day before meals  insulin lispro (ADMELOG) corrective regimen sliding scale   SubCutaneous at bedtime  insulin lispro Injectable (ADMELOG) 10 Unit(s) SubCutaneous three times a day before meals  losartan 100 milliGRAM(s) Oral daily  metoprolol tartrate 25 milliGRAM(s) Oral two times a day  multivitamin 1 Tablet(s) Oral daily    MEDICATIONS  (PRN):  dextrose Oral Gel 15 Gram(s) Oral once PRN Blood Glucose LESS THAN 70 milliGRAM(s)/deciliter  meclizine 25 milliGRAM(s) Oral every 8 hours PRN Dizziness              
Chief Complaint: Syncope    Interval Hx: Patient seen and examined. Communicated with patient utilizing [employer] (Language: Bengali). Patient states that she has ongoing dizziness, describes it as room spinning, not really positional, is occurring when she is laying in bed. No headache. No change in speech or vision. No focal weakness or numbness. No ear complaints such as pain or drainage or decreased hearing. Orthostatics are now negative. No No palpitations. No chest pain or tightness. No shortness of breath. No abdominal complaints or genitourinary complaints. Given her vertigo, and her risk factors for stroke (DM, HTN, elevated to 170s/100s + at home) ordered for MRI brain MRA head and neck. Patient also notably hypoglycemic pre-dinner this evening. Improved with glucose, dinner. Held PM Lantus (was getting Lantus 40 in AM and PM. Switched to just AM Lantus for now, Lispro correctional scale.     ROS: Multi system review is comprehensively negative x 10 systems except for vertigo    Vitals:  T(F): 97.6 (16 Sep 2022 21:15), Max: 97.6 (16 Sep 2022 21:15)  HR: 70 (16 Sep 2022 21:15) (70 - 70)  BP: 165/72 (16 Sep 2022 21:15) (165/72 - 165/72)  RR: 18 (16 Sep 2022 21:15) (18 - 18)  SpO2: 98% (16 Sep 2022 21:15) (98% - 98%) on room air    Exam:  Gen: Comfortable appearing  HEENT: NCAT PERRL EOMI MMM clear oropharynx  Neck: Supple, no JVD, no LAD  Chest: Normal resp effort, lungs CTA B/L  CVS: s1 s2 normal, RRR  Abd: +BS, soft NT ND   Ext: No edema or calf tenderness  Skin: Warm, dry, intact  Neuro: A+OX3, speech fluent, no aphasia, repetition and naming intact, CN intact, no drift, strength 5/5 UEs and LEs B/L, sensation intact to pin prick, reflexes normal and symmetric, plantars downgoing, no dysmetria, gait not tested  Mood: Calm and pleasant    Labs:                       10.2   7.26 )-----------( 210             30.6       138  |  105  |  20  -----------------------< 154  4.7   |   31   |  0.87    Ca 9.1  Phos 3.8   Mg 1.5    TPro  7.3  /  Alb  3.7  /  TBili  0.5  /  DBili  x   /  AST  21  /  ALT  30  /  AlkPhos  65      Troponin negative x 2   proBNP WNL   TSH 5.03   A1c 7.0    Urinalysis 9/16: Clear, ket-, prob-, N-, LE-, o WBC, o RBC, o bacteria    Micro:  COVID negative  Flu negative  RSV negative    Imaging:  MR brain, MRA head and neck ordered    CT head 9/15: Involutional changes and volume loss are noted,, with no acute infarct, hemorrhage or space-occupying lesion. Midline with mild ex vacuo enlargement.  The brain stem and cerebellum are unremarkable.  No CP angle lesion noted. No subdural or epidural collections are noted. No fracture or destructive lesion is identified. Clear sinuses and mastoid. No orbital or suprasellar abnormality noted.    Cardiac Testing:  TTE 9/16: Left ventricle is normal in size, wall thickness, wall motion and contractility. Estimated left ventricular ejection fraction is 60-65 %. Normal appearing left atrium. Normal appearing right atrium. Normal appearing right ventricle structure and function. The aortic valve is well visualized, appears mildly calcified. Valve opening seems to be normal. The mitral valve leaflets appear calcified. EA reversal of the mitral inflow consistent with reduced compliance of the left ventricle. Trace mitral regurgitation is present. No evidence of pericardial effusion.    EKG 9/15: Rate WNL. Sinus rhythm with occasional premature ventricular complexes. Nonspecific T wave abnormality    Meds:  MEDICATIONS  (STANDING):  aspirin  chewable 81 milliGRAM(s) Oral daily  atorvastatin 80 milliGRAM(s) Oral at bedtime  enoxaparin Injectable 40 milliGRAM(s) SubCutaneous every 24 hours  escitalopram 10 milliGRAM(s) Oral daily  gabapentin 300 milliGRAM(s) Oral three times a day  influenza   Vaccine 0.5 milliLiter(s) IntraMuscular once  insulin lispro (ADMELOG) corrective regimen sliding scale   SubCutaneous three times a day before meals  insulin lispro (ADMELOG) corrective regimen sliding scale   SubCutaneous at bedtime  losartan 100 milliGRAM(s) Oral daily  metoprolol tartrate 25 milliGRAM(s) Oral two times a day  multivitamin 1 Tablet(s) Oral daily    MEDICATIONS  (PRN):  dextrose Oral Gel 15 Gram(s) Oral once PRN Blood Glucose LESS THAN 70 milliGRAM(s)/deciliter  meclizine 25 milliGRAM(s) Oral every 8 hours PRN Dizziness                
CHIEF COMPLAINT:    HPI:  Patient is a 63 y/o Serbian speaking female with a PMH of CAD s/p 2 stents (placed in [facility] 6-7 years ago), HTN, HLD, T2DM (complicated by neuropathy), and depression presenting to the ED at the insistence of her family after she fainted the day prior to admission. Before she fainted she was resting on the sofa watching her granddaughter play when she suddenly felt dizzy and nauseous and then fell off the sofa. The fall was witnessed by her daughter-in-law. Immediately after the episode she reports sweating excessively and her body feeling cold. This is the first time she experienced such an event. At the time of the incident she denied having chest pain, palpitations, shortness of breath, or loss of vision. She checked her blood glucose around the same time and reports it was 133. She mentions that since the past 3 days prior to admission she had been feeling dizzy. This prompted her to check her blood pressure (she usually does not regularly check her blood pressure at home). She noted having elevated readings at home despite being compliant with her home blood pressure medication. Reported having systolic values in the 160-170 range; does not recall the diastolic values but says prior to her syncopal episode her BP reading was 175/102. She does not report being in pain or stress lately.  She stays well hydrated throughout the day. She denies any further episodes since yesterday.     Upon arrival to the ED, vitals were stable. /58 HR 57 RR 16 SpO2 96% on RA. No significant labs. CT head did not show any acute infarct, hemorrhage or space-occupying lesion.  (16 Sep 2022 00:41)  Cardiology requested to evaluate syncopal episode. History as above. She denies exertional symptoms prior to event. She is followed by a physician in [place] and reports no acute issues. Conversation was performed through .     9/17/22: Denies chest pain or shortness of breath. Continues to report dizziness.  Tele: normal sinus rhythm, 60's-80's, APC's, atrial couplets      PAST MEDICAL & SURGICAL HISTORY:  Diabetes mellitus type 2 in nonobese      Stented coronary artery  x2      HTN (hypertension)      High cholesterol      Neuropathy      Depression      History of coronary artery stent placement  x2          Allergies    No Known Allergies    Intolerances      MEDICATIONS  (STANDING):  aspirin  chewable 81 milliGRAM(s) Oral daily  atorvastatin 80 milliGRAM(s) Oral at bedtime  dextrose 5%. 1000 milliLiter(s) (50 mL/Hr) IV Continuous <Continuous>  dextrose 5%. 1000 milliLiter(s) (100 mL/Hr) IV Continuous <Continuous>  dextrose 50% Injectable 25 Gram(s) IV Push once  dextrose 50% Injectable 12.5 Gram(s) IV Push once  dextrose 50% Injectable 25 Gram(s) IV Push once  escitalopram 10 milliGRAM(s) Oral daily  gabapentin 300 milliGRAM(s) Oral three times a day  glucagon  Injectable 1 milliGRAM(s) IntraMuscular once  influenza   Vaccine 0.5 milliLiter(s) IntraMuscular once  insulin glargine Injectable (LANTUS) 40 Unit(s) SubCutaneous two times a day  insulin lispro (ADMELOG) corrective regimen sliding scale   SubCutaneous three times a day before meals  insulin lispro (ADMELOG) corrective regimen sliding scale   SubCutaneous at bedtime  insulin lispro Injectable (ADMELOG) 20 Unit(s) SubCutaneous three times a day before meals  losartan 100 milliGRAM(s) Oral daily  metoprolol tartrate 25 milliGRAM(s) Oral two times a day  multivitamin 1 Tablet(s) Oral daily    MEDICATIONS  (PRN):  dextrose Oral Gel 15 Gram(s) Oral once PRN Blood Glucose LESS THAN 70 milliGRAM(s)/deciliter    Vital Signs Last 24 Hrs  T(C): 36.4 (16 Sep 2022 21:15), Max: 36.4 (16 Sep 2022 21:15)  T(F): 97.6 (16 Sep 2022 21:15), Max: 97.6 (16 Sep 2022 21:15)  HR: 70 (16 Sep 2022 21:15) (70 - 70)  BP: 165/72 (16 Sep 2022 21:15) (165/72 - 165/72)  BP(mean): --  RR: 18 (16 Sep 2022 21:15) (18 - 18)  SpO2: 98% (16 Sep 2022 21:15) (98% - 98%)    Parameters below as of 16 Sep 2022 21:15  Patient On (Oxygen Delivery Method): room air      Orthostatic VS  09-17-22 @ 07:00  Lying BP: 121/43 HR: 54  Sitting BP: 112/53 HR: 57  Standing BP: 109/49 HR: 58  Site: --  Mode: --  Orthostatic VS  09-16-22 @ 07:42  Lying BP: 124/63 HR: 61  Sitting BP: 113/48 HR: 61  Standing BP: 107/57 HR: 63  Site: upper left arm  Mode: electronic  Orthostatic VS  09-16-22 @ 05:18  Lying BP: 157/58 HR: 57  Sitting BP: 127/59 HR: 62  Standing BP: 113/56 HR: 62  Site: upper left arm  Mode: electronic  I&O's Summary      PHYSICAL EXAM:    Constitutional: NAD, awake and alert, well-developed  HEENT: PERR, EOMI,  No oral cyanosis  Neck:  supple,  No JVD  Respiratory: Breath sounds are clear bilaterally, No wheezing, rales or rhonchi  Cardiovascular: S1 and S2, regular rate and rhythm, no Murmurs, gallops or rubs  Gastrointestinal: Bowel Sounds present, soft, nontender.   Extremities: No peripheral edema. No clubbing or cyanosis.  Vascular: 2+ peripheral pulses  Neurological: A/O x 3, no focal deficits  Musculoskeletal: no calf tenderness.  Skin: No rashes.      LABS: All Labs Reviewed:                                 10.2   7.26  )-----------( 210      ( 16 Sep 2022 05:23 )             30.6     09-17    142  |  108  |  13  ----------------------------<  146<H>  4.2   |  30  |  0.76    Ca    9.3      17 Sep 2022 08:05  Phos  3.8     09-16  Mg     1.8     09-17    TPro  7.3  /  Alb  3.7  /  TBili  0.5  /  DBili  x   /  AST  21  /  ALT  30  /  AlkPhos  65  09-15      16 Sep 2022 05:23    138    |  105    |  20     ----------------------------<  154    4.7     |  31     |  0.87   15 Sep 2022 18:53    136    |  104    |  23     ----------------------------<  250    4.5     |  28     |  1.06     Ca    9.1        16 Sep 2022 05:23  Ca    9.2        15 Sep 2022 18:53  Phos  3.8       16 Sep 2022 05:23  Mg     1.5       16 Sep 2022 05:23    TPro  7.3    /  Alb  3.7    /  TBili  0.5    /  DBili  x      /  AST  21     /  ALT  30     /  AlkPhos  65     15 Sep 2022 18:53    PT/INR - ( 16 Sep 2022 05:23 )   PT: 11.3 sec;   INR: 0.97 ratio         PTT - ( 16 Sep 2022 05:23 )  PTT:30.1 sec      Blood Culture:   09-15 @ 18:53  Pro Bnp 157    09-16 @ 05:23  TSH: 5.03      RADIOLOGY/EKG: < from: 12 Lead ECG (09.15.22 @ 18:03) >  Diagnosis Line Sinus rhythm with occasional Premature ventricular complexes  Nonspecific T wave abnormality  Abnormal ECG  Confirmed by ANDERSON FELDMAN MD ([id number]) on 9/16/2022 6:39:07 AM    < end of copied text >    < from: TTE Echo Complete w/o Contrast w/ Doppler (09.16.22 @ 10:15) >   Impression     Summary     The left ventricle is normal in size, wall thickness, wall motion and   contractility.   Estimated left ventricular ejectionfraction is 60-65 %.   Normal appearing left atrium.   Normal appearing right atrium.   Normal appearing right ventricle structure and function.   The aortic valve is well visualized, appears mildly calcified. Valve   opening seems to be normal.   The mitral valve leaflets appear calcified.   EA reversal of the mitral inflow consistent with reduced compliance of   the   left ventricle.   Trace mitral regurgitation is present.   No evidence of pericardial effusion.     Signature     ----------------------------------------------------------------   Electronically signed by Rowdy Warren MD(Interpreting   physician) on 09/16/2022 09:55 PM    < end of copied text >

## 2022-09-19 NOTE — DISCHARGE NOTE PROVIDER - NSDCMRMEDTOKEN_GEN_ALL_CORE_FT
aspirin 81 mg oral tablet: 1 tab(s) orally once a day  clopidogrel 75 mg oral tablet: 1 tab(s) orally once a day  escitalopram 10 mg oral tablet: 1 tab(s) orally once a day  gabapentin 300 mg oral capsule: 1 cap(s) orally 3 times a day  Lantus 100 units/mL subcutaneous solution: 40 unit(s) subcutaneous 2 times a day  Lipitor 80 mg oral tablet: 1 tab(s) orally once a day  losartan 100 mg oral tablet: 1 tab(s) orally once a day  metFORMIN 1000 mg oral tablet: 1 tab(s) orally 2 times a day  metoprolol tartrate 25 mg oral tablet: 1 tab(s) orally 2 times a day  Multiple Vitamins oral tablet: 1 tab(s) orally once a day  NovoLOG 100 units/mL subcutaneous solution: 20 unit(s) subcutaneous 3 times a day  Vitamin D3 1250 mcg (50,000 intl units) oral capsule: 1 cap(s) orally once a week  ***Monday***

## 2022-09-19 NOTE — DISCHARGE NOTE PROVIDER - HOSPITAL COURSE
62 year old woman with DM, HTN, HLD, CAD, hx of PCI x 2, depression, presented for further evaluation after an episode of syncope. Patient was feeling generally unwell the preceding day or two with a bit of malaise, mild nausea. No fever or chills. No chest pain or shortness of breath. No cough. No abdominal pain, vomiting, diarrhea or dysuria. No rash. No synovitis. On the day of presentation, she was quite hypertensive, /102, had a bit of a headache, some lightheadedness. She was seated on the couch and passed out briefly and came to in a sweat. No seizure activity seen. In the ED, vitals were normal. Exam was unremarkable. Labs also were unrevealing. She was placed on telemetry and triaged to [facility].     Syncope  Initial thought to be vasovagal or orthostatic (was orthostatic on 9/16). Got 1L NS IV fluids and orthostasis has resolved. No events on tele. No signs of acute coronary syndrome. No signs of heart failure. No evidence to suggest PE. No signs or symptoms of infection. Neurologically she is fully intact but she was reporting vertigo 9/17. No focal neurological deficits or other neurological symptoms. Does have risk factors for stroke with DM, uncontrolled HTN. Thus, she underwent MR brain, negative for acute infarction. No mass, hemorrhage or other findings other than mild periventricular white matter ischemia. MRA of the head was also unremarkable. MRA of the neck however did show high grade stenosis (See below) Management of underlying issues at this point, see below.    High grade R carotid stenosis  MRA neck 9/17 with findings of high-grade (80-90%) stenosis at the distal R CCA immediately prior to the bifurcation. High-grade (70-80%) stenosis at the origin of the R internal carotid artery. L carotid system with no hemodynamically significant stenosis. Already on aspirin, high dose statin. Added clopidogrel. Consulted Vascular Surgery who advised US duplex of B/L carotids which confirmed the R carotid disease. Vascular Surgery agreed with DAPT, high intensity statin. No immediate need for surgery per their impression. Patient to follow up with Dr. Fortino Garcia.     Type II DM  A1c 7.0. On Lantus and Lispro here. Resume home regimen upon discharge.     HTN  BP uncontrolled at home but now improved. Continue metoprolol and losartan    HLD  Stable. Continue statin    Depression  Stable. Continue Lexapro      Discharge Exam:  Afebrile  /53  HR 57  RR 18  O2 99% on room air  Gen: Comfortable appearing  HEENT: NCAT PERRL EOMI MMM clear oropharynx  Neck: Supple, no JVD, no LAD  Chest: Normal resp effort, lungs CTA B/L  CVS: s1 s2 normal, RRR  Abd: +BS, soft NT ND   Ext: No edema or calf tenderness  Skin: Warm, dry, intact  Neuro: A+OX3, speech fluent, no aphasia, repetition and naming intact, CN intact, no drift, strength 5/5 UEs and LEs B/L, sensation intact to pin prick, reflexes normal and symmetric, plantars downgoing, no dysmetria, steady gait  Mood: Calm and pleasant    Labs:                       10.2   7.26 )-----------( 210             30.6       135  |  102  |  17  ---------------------<  259  4.3   |  30  |  0.91    Ca 9.0   Mg 1.8    TPro  7.3  /  Alb  3.7  /  TBili  0.5  /  DBili  x   /  AST  21  /  ALT  30  /  AlkPhos  65      Troponin negative x 2   proBNP WNL   TSH 5.03   A1c 7.0    Urinalysis 9/16: Clear, ket-, prob-, N-, LE-, o WBC, o RBC, o bacteria    Micro:  COVID negative  Flu negative  RSV negative    Imaging:  US B/L carotid duplex 9/19: Significant plaque formation is identified within the distal right coronary carotid artery, right carotid bulb and proximal right ICA segment. Less prominent plaque formation is identified within the left   carotid bulb. Antegrade flow is present in both vertebral arteries. Velocities expressed in centimeters per second are as follows: RIGHT:  Proximal CCA = 52.2; Distal CCA = 57.0; Proximal ICA = 257.3; Distal ICA = 95.0;  ECA = 205.3 LEFT:  Proximal CCA = 83.4; Distal CCA = 49.4; Proximal ICA = 94.7; Distal ICA = 123.2;  ECA = 91.4 Right peak systolic IC/CC velocity ratio: 5.8 Left peak systolic IC/CC velocity ratio: 1.5. Right carotid system with greater than 70% right ICA diameter stenosis. Left carotid system with no hemodynamically significant stenosis is found.    MR brain 9/17: Mild periventricular white matter ischemia. No acute cerebral cortical infarct is found. No intracranial hemorrhage is recognized. No mass effect is found in the brain. The ventricles, sulci and basal cisterns appear unremarkable.     MRA head and neck 9/17: Unremarkable intracranial MRA. MRA neck with findings of high-grade (80-90%) stenosis at the distal R CCA immediately prior to the bifurcation. High-grade (70-80%) stenosis at the origin of the R internal carotid artery. L carotid system with no hemodynamically significant stenosis.    CT head 9/15: Involutional changes and volume loss are noted,, with no acute infarct, hemorrhage or space-occupying lesion. Midline with mild ex vacuo enlargement.  The brain stem and cerebellum are unremarkable.  No CP angle lesion noted. No subdural or epidural collections are noted. No fracture or destructive lesion is identified. Clear sinuses and mastoid. No orbital or suprasellar abnormality noted.    Cardiac Testing:  TTE 9/16: Left ventricle is normal in size, wall thickness, wall motion and contractility. Estimated left ventricular ejection fraction is 60-65 %. Normal appearing left atrium. Normal appearing right atrium. Normal appearing right ventricle structure and function. The aortic valve is well visualized, appears mildly calcified. Valve opening seems to be normal. The mitral valve leaflets appear calcified. EA reversal of the mitral inflow consistent with reduced compliance of the left ventricle. Trace mitral regurgitation is present. No evidence of pericardial effusion.    EKG 9/15: Rate WNL. Sinus rhythm with occasional premature ventricular complexes. Nonspecific T wave abnormality

## 2022-09-19 NOTE — DISCHARGE NOTE PROVIDER - NSDCCPCAREPLAN_GEN_ALL_CORE_FT
PRINCIPAL DISCHARGE DIAGNOSIS  Diagnosis: Syncope  Assessment and Plan of Treatment: You were admitted to the hospital for evaluation after an episode of syncope (fainting). The cause of your syncope may have been orthostasis (drop in blood pressure), as you were orthostatic on 9/16 this visit. Your blood pressure dropped significantly when you stood up. You were given intravenous fluid hydration and that issue resolved. Alternatively, the epsiode could have been a vaso-vagal syncope. You were also diagnosed this admission with carotid artery disease and it is unclear to what degree that also may have contributed to your episode. Reassuringly, you have no signs of a heart issue. No arrhythmia. No heart attack. No heart failure. No signs of pulmonary embolism (lung blood clot). No signs or symptoms of infection. Your neurological exam was normal but you did report symptom of vertigo so you underwent an MRI of the brain which ruled out stroke. The MRA (blood vessel portion of the study) revealed significant narrowing of your right carotid artery, see below. You are now doing well, feeling well, no further dizziness or vertigo, walking steadily and medically cleared for discharge home. Please follow up with your primary care provider and the vascular surgeon. If you experience recurrence of dizziness, vertigo, lightheadedness, or other concerning complaints, please return to the hospital and/or call your doctor.      SECONDARY DISCHARGE DIAGNOSES  Diagnosis: Carotid artery disease  Assessment and Plan of Treatment: This admission, you were found to have high-grade right carotid stenosis (a narrowing of a major blood vessel that goes up the neck towards your brain). This finding was evidence on an MRA of the neck and confirmed on ultrasound doppler of the carotid arteries. You were seen and evaluated by a Vascular Surgeon who found no immediate need for surgery but did wish to see you in the office to discuss a surgical intervention called a carotid endarterectomy. For now, you are to continue on aspirin daily, add clopidogrel daily, and continue on your cholesterol medication, especially your atorvastatin at its currend dose (which is the highest dose). Please call the office of Vascular Surgery Dr. Fortino Garcia for an appointement.

## 2022-09-19 NOTE — DISCHARGE NOTE PROVIDER - NSDCCAREPROVSEEN_GEN_ALL_CORE_FT
Fortino Garcia (Vascular Surgery)  Stephen Juarez (Hospital Medicine)  Neymar Davenport ([facility] Medicine)  Franky Price (Cardiology)

## 2022-09-19 NOTE — DISCHARGE NOTE NURSING/CASE MANAGEMENT/SOCIAL WORK - PATIENT PORTAL LINK FT
You can access the FollowMyHealth Patient Portal offered by [facility] by registering at the following website: [email or web address]. By joining [employer]’s FollowMyHealth portal, you will also be able to view your health information using other applications (apps) compatible with our system.

## 2022-09-22 ENCOUNTER — APPOINTMENT (OUTPATIENT)
Dept: VASCULAR SURGERY | Facility: CLINIC | Age: 62
End: 2022-09-22

## 2022-09-22 VITALS
OXYGEN SATURATION: 95 % | HEART RATE: 60 BPM | BODY MASS INDEX: 30.04 KG/M2 | WEIGHT: 153 LBS | SYSTOLIC BLOOD PRESSURE: 128 MMHG | TEMPERATURE: 97.3 F | RESPIRATION RATE: 16 BRPM | HEIGHT: 60 IN | DIASTOLIC BLOOD PRESSURE: 76 MMHG

## 2022-09-22 DIAGNOSIS — I65.21 OCCLUSION AND STENOSIS OF RIGHT CAROTID ARTERY: ICD-10-CM

## 2022-09-22 DIAGNOSIS — Z79.4 LONG TERM (CURRENT) USE OF INSULIN: ICD-10-CM

## 2022-09-22 DIAGNOSIS — I10 ESSENTIAL (PRIMARY) HYPERTENSION: ICD-10-CM

## 2022-09-22 DIAGNOSIS — Z79.82 LONG TERM (CURRENT) USE OF ASPIRIN: ICD-10-CM

## 2022-09-22 DIAGNOSIS — Z95.5 PRESENCE OF CORONARY ANGIOPLASTY IMPLANT AND GRAFT: ICD-10-CM

## 2022-09-22 DIAGNOSIS — Z79.84 LONG TERM (CURRENT) USE OF ORAL HYPOGLYCEMIC DRUGS: ICD-10-CM

## 2022-09-22 DIAGNOSIS — E78.5 HYPERLIPIDEMIA, UNSPECIFIED: ICD-10-CM

## 2022-09-22 DIAGNOSIS — F32.A DEPRESSION, UNSPECIFIED: ICD-10-CM

## 2022-09-22 DIAGNOSIS — I25.10 ATHEROSCLEROTIC HEART DISEASE OF NATIVE CORONARY ARTERY WITHOUT ANGINA PECTORIS: ICD-10-CM

## 2022-09-22 DIAGNOSIS — E11.40 TYPE 2 DIABETES MELLITUS WITH DIABETIC NEUROPATHY, UNSPECIFIED: ICD-10-CM

## 2022-09-22 DIAGNOSIS — R55 SYNCOPE AND COLLAPSE: ICD-10-CM

## 2022-09-22 PROBLEM — Z00.00 ENCOUNTER FOR PREVENTIVE HEALTH EXAMINATION: Status: ACTIVE | Noted: 2022-09-22

## 2022-09-22 PROCEDURE — 99203 OFFICE O/P NEW LOW 30 MIN: CPT | Mod: 57

## 2022-09-26 PROBLEM — I65.21 ASYMPTOMATIC STENOSIS OF RIGHT CAROTID ARTERY: Status: ACTIVE | Noted: 2022-09-23

## 2022-09-26 NOTE — DATA REVIEWED
[FreeTextEntry1] : 9/19:Carotid Artery Duplex\par RIGHT: Proximal CCA = 52.2; Distal CCA = 57.0; Proximal ICA = 257.3; Distal ICA = 95.0; ECA = 205.3\par LEFT: Proximal CCA = 83.4; Distal CCA = 49.4; Proximal ICA = 94.7; Distal ICA = 123.2; ECA = 91.4\par \par Right peak systolic IC/CC velocity ratio: 5.8\par Left peak systolic IC/CC velocity ratio: 1.5\par \par IMPRESSION:\par 1. Right carotid system: Greater than 70% right ICA diameter stenosis.\par 2. Left carotid system: No hemodynamically significant stenosis is found.

## 2022-09-26 NOTE — HISTORY OF PRESENT ILLNESS
[FreeTextEntry1] : 63yo female initially presented to [facility] 9/17/22 (5 days ago) with syncope. In work up of syncope patient found to have severe right ICA stenosis. She denies any previous episodes of CVA, TIA, ameurosis fugax. She had never previously been told she has carotid disease.\par \par PMH: CAD (PCI with multiple stents), DM, HTN, HLD\par PSH: foot surgery, cataract surgery\par Meds: Lantus, Novolog, ASA 81, plavix, atorvastatin, metoprolol, fenifibrate\par SocH: nonsmoker, no EtOH\par

## 2022-09-26 NOTE — ASSESSMENT
[FreeTextEntry1] : 61yo female asymptomatic severe right ICA stenosis\par -Continue best medical therapy with ASA, plavix, statin, blood pressure control\par -Discussed risks, benefits and alternatives to right carotid endarterectomy. Explained that reason for procedure is to lower CVA risk from 12% to 5% over next 5 years. Also explained that endarterectomy comes with paul-procedure stroke risk, risk of MACE, risk of cranial nerve injury and risk of bleeding & infection. In speaking to patient and her daughter she would like to proceed with endarterectomy to decrease risk of stroke.\par \par -Will plan for cardiac and medical clearance, optimization and risk stratification in anticipation for surgery

## 2022-09-26 NOTE — PHYSICAL EXAM
[Normal Breath Sounds] : Normal breath sounds [Normal Heart Sounds] : normal heart sounds [Normal Rate and Rhythm] : normal rate and rhythm [2+] : left 2+ [JVD] : no jugular venous distention  [Ankle Swelling (On Exam)] : not present [Varicose Veins Of Lower Extremities] : not present [] : not present [Alert] : alert [Oriented to Person] : oriented to person [Oriented to Place] : oriented to place [Oriented to Time] : oriented to time [de-identified] : well appearing female  [FreeTextEntry1] : Right foot: well healed scar, deformity  [de-identified] : 5/5 strength in all muscle groups upper and lower ext\par no facial droop\par 5/5 sensation to light tough b/l in upper and lower extremity

## 2022-10-12 ENCOUNTER — APPOINTMENT (OUTPATIENT)
Dept: VASCULAR SURGERY | Facility: HOSPITAL | Age: 62
End: 2022-10-12

## 2022-10-13 LAB — SARS-COV-2 N GENE NPH QL NAA+PROBE: NOT DETECTED

## 2023-01-01 NOTE — ED ADULT NURSE NOTE - PAIN: BODY LOCATION
Right:/foot 1. I was told the name of the doctor(s) who took care of my child while in the hospital.    2. I have been told about any important findings on my child's plan of care.    3. The doctor clearly explained my child's diagnosis and other possible diagnoses that were considered.    4. My child's doctor explained all the tests that were done and their results (if available). I understand that some of the test results may not be ready before we go home and I was told how I can get these results. I understand that a summary of my child's hospitalization and important test results will be shared with my child's outpatient doctor.    5. My child's doctor talked to me about what I need to do when we go home.    6. I understand what signs and symptoms to watch for. I understand what symptoms I would need to call my doctor for and/or return to the hospital.    7. I have the phone number to call the hospital for results and/or questions after I leave the hospital.

## 2023-01-30 ENCOUNTER — EMERGENCY (EMERGENCY)
Facility: HOSPITAL | Age: 63
LOS: 0 days | Discharge: HOME | End: 2023-01-30
Attending: EMERGENCY MEDICINE
Payer: MEDICAID

## 2023-01-30 VITALS — WEIGHT: 149.91 LBS | HEIGHT: 60 IN

## 2023-01-30 VITALS
SYSTOLIC BLOOD PRESSURE: 118 MMHG | TEMPERATURE: 98 F | RESPIRATION RATE: 18 BRPM | HEART RATE: 68 BPM | DIASTOLIC BLOOD PRESSURE: 62 MMHG | OXYGEN SATURATION: 96 %

## 2023-01-30 DIAGNOSIS — Z23 ENCOUNTER FOR IMMUNIZATION: ICD-10-CM

## 2023-01-30 DIAGNOSIS — T31.0 BURNS INVOLVING LESS THAN 10% OF BODY SURFACE: ICD-10-CM
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
## 2023-01-30 DIAGNOSIS — T23.321A BURN OF THIRD DEGREE OF SINGLE RIGHT FINGER (NAIL) EXCEPT THUMB, INITIAL ENCOUNTER: ICD-10-CM

## 2023-01-30 DIAGNOSIS — I10 ESSENTIAL (PRIMARY) HYPERTENSION: ICD-10-CM

## 2023-01-30 DIAGNOSIS — F32.A DEPRESSION, UNSPECIFIED: ICD-10-CM

## 2023-01-30 DIAGNOSIS — E78.00 PURE HYPERCHOLESTEROLEMIA, UNSPECIFIED: ICD-10-CM

## 2023-01-30 DIAGNOSIS — Z79.84 LONG TERM (CURRENT) USE OF ORAL HYPOGLYCEMIC DRUGS: ICD-10-CM
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
## 2023-01-30 DIAGNOSIS — Z95.5 PRESENCE OF CORONARY ANGIOPLASTY IMPLANT AND GRAFT: Chronic | ICD-10-CM

## 2023-01-30 DIAGNOSIS — Z95.5 PRESENCE OF CORONARY ANGIOPLASTY IMPLANT AND GRAFT: ICD-10-CM

## 2023-01-30 DIAGNOSIS — E11.40 TYPE 2 DIABETES MELLITUS WITH DIABETIC NEUROPATHY, UNSPECIFIED: ICD-10-CM

## 2023-01-30 DIAGNOSIS — Z79.82 LONG TERM (CURRENT) USE OF ASPIRIN: ICD-10-CM

## 2023-01-30 DIAGNOSIS — Z79.4 LONG TERM (CURRENT) USE OF INSULIN: ICD-10-CM

## 2023-01-30 PROCEDURE — 90715 TDAP VACCINE 7 YRS/> IM: CPT

## 2023-01-30 PROCEDURE — 73140 X-RAY EXAM OF FINGER(S): CPT | Mod: RT

## 2023-01-30 PROCEDURE — 90471 IMMUNIZATION ADMIN: CPT

## 2023-01-30 PROCEDURE — 99285 EMERGENCY DEPT VISIT HI MDM: CPT | Mod: 25

## 2023-01-30 PROCEDURE — 73140 X-RAY EXAM OF FINGER(S): CPT | Mod: 26,RT

## 2023-01-30 PROCEDURE — 99285 EMERGENCY DEPT VISIT HI MDM: CPT

## 2023-01-30 RX ORDER — CEPHALEXIN 500 MG
1 CAPSULE ORAL
Qty: 28 | Refills: 0
Start: 2023-01-30 | End: 2023-02-05

## 2023-01-30 RX ORDER — TETANUS TOXOID, REDUCED DIPHTHERIA TOXOID AND ACELLULAR PERTUSSIS VACCINE, ADSORBED 5; 2.5; 8; 8; 2.5 [IU]/.5ML; [IU]/.5ML; UG/.5ML; UG/.5ML; UG/.5ML
0.5 SUSPENSION INTRAMUSCULAR ONCE
Refills: 0 | Status: COMPLETED | OUTPATIENT
Start: 2023-01-30 | End: 2023-01-30

## 2023-01-30 RX ORDER — CEPHALEXIN 500 MG
500 CAPSULE ORAL ONCE
Refills: 0 | Status: COMPLETED | OUTPATIENT
Start: 2023-01-30 | End: 2023-01-30

## 2023-01-30 RX ADMIN — TETANUS TOXOID, REDUCED DIPHTHERIA TOXOID AND ACELLULAR PERTUSSIS VACCINE, ADSORBED 0.5 MILLILITER(S): 5; 2.5; 8; 8; 2.5 SUSPENSION INTRAMUSCULAR at 19:27

## 2023-01-30 RX ADMIN — Medication 500 MILLIGRAM(S): at 19:28

## 2023-01-30 NOTE — ED STATDOCS - CLINICAL SUMMARY MEDICAL DECISION MAKING FREE TEXT BOX
Patient with burn to middle finger, decreased sensation, no active drainage, full ROM. Treat with tetanus, antibiotics, and f/u with hand.

## 2023-01-30 NOTE — ED STATDOCS - ATTENDING APP SHARED VISIT CONTRIBUTION OF CARE
I personally saw the patient with the GHISLAINE, and completed the key components of the history and physical exam. I then discussed the management plan with the GHISLAINE.

## 2023-01-30 NOTE — ED ADULT TRIAGE NOTE - CHIEF COMPLAINT QUOTE
Pt c/o burning R 3rd digit 2 days ago while cooking on a metal pot. States now blistering and has nail involvement.

## 2023-01-30 NOTE — ED STATDOCS - CARE PROVIDER_API CALL
Jerson Luke)  Orthopaedic Surgery; Surgery of the Hand  [address], [address]  [place] [address]  Phone: [phone]  Fax: [phone]  Follow Up Time:

## 2023-01-30 NOTE — ED STATDOCS - NSTIMEPROVIDERCAREINITIATE_GEN_ER
30-Jan-2023 18:22 Problem: Patient Care Overview  Goal: Plan of Care/Patient Progress Review  Outcome: No Change  Baby has stable vital signs  Breast feeding every 3 hours.  Mother needing some assistance with getting proper latch with feeds.  Voiding and stooling.  Circumcision site without bleeding, has voided.  Circumcision cares reviewed with both parents with verbal understanding.

## 2023-01-30 NOTE — ED STATDOCS - OBJECTIVE STATEMENT
52 year old female with PMHx of HTN, DM on insulin, cardiac stents presents to the ED c/o burn on right hand third digit injury while cooking and spilling oil x1 week ago. Last tetanus unknown. No other complaints at this time.

## 2023-01-30 NOTE — ED ADULT NURSE NOTE - OBJECTIVE STATEMENT
52 year old female presents to the ED c/o burn on right hand third digit injury while cooking and spilling oil x1 week ago. Last tetanus unknown. No other complaints at this time.

## 2023-01-30 NOTE — ED STATDOCS - PROGRESS NOTE DETAILS
patient with deep burn to finger, requested hand consult here.  case d/w Dr. Luke, residents were to come evaluate but patient no longer wants to wait.  Area irrigated and dressed with xeroform.  patient placed on keflex and will follow up in the office -Kim Kaur PA-C

## 2023-01-30 NOTE — ED STATDOCS - PATIENT PORTAL LINK FT
You can access the FollowMyHealth Patient Portal offered by [facility] by registering at the following website: [email or web address]. By joining [employer]’s FollowMyHealth portal, you will also be able to view your health information using other applications (apps) compatible with our system.

## 2023-01-30 NOTE — ED STATDOCS - SKIN, MLM
burn to right 3rd finger, blister to index finger, third degree burn to finger. Full ROM, decreased sensation at tip.

## 2023-06-04 ENCOUNTER — EMERGENCY (EMERGENCY)
Facility: HOSPITAL | Age: 63
LOS: 0 days | Discharge: HOME | End: 2023-06-05
Attending: EMERGENCY MEDICINE
Payer: MEDICAID

## 2023-06-04 VITALS — WEIGHT: 149.91 LBS | HEIGHT: 62 IN

## 2023-06-04 DIAGNOSIS — N39.0 URINARY TRACT INFECTION, SITE NOT SPECIFIED: ICD-10-CM

## 2023-06-04 DIAGNOSIS — Z79.84 LONG TERM (CURRENT) USE OF ORAL HYPOGLYCEMIC DRUGS: ICD-10-CM

## 2023-06-04 DIAGNOSIS — Z79.4 LONG TERM (CURRENT) USE OF INSULIN: ICD-10-CM

## 2023-06-04 DIAGNOSIS — R10.9 UNSPECIFIED ABDOMINAL PAIN: ICD-10-CM

## 2023-06-04 DIAGNOSIS — I10 ESSENTIAL (PRIMARY) HYPERTENSION: ICD-10-CM

## 2023-06-04 DIAGNOSIS — Z79.82 LONG TERM (CURRENT) USE OF ASPIRIN: ICD-10-CM

## 2023-06-04 DIAGNOSIS — E78.00 PURE HYPERCHOLESTEROLEMIA, UNSPECIFIED: ICD-10-CM

## 2023-06-04 DIAGNOSIS — I25.10 ATHEROSCLEROTIC HEART DISEASE OF NATIVE CORONARY ARTERY WITHOUT ANGINA PECTORIS: ICD-10-CM

## 2023-06-04 DIAGNOSIS — E11.40 TYPE 2 DIABETES MELLITUS WITH DIABETIC NEUROPATHY, UNSPECIFIED: ICD-10-CM

## 2023-06-04 DIAGNOSIS — Z95.5 PRESENCE OF CORONARY ANGIOPLASTY IMPLANT AND GRAFT: Chronic | ICD-10-CM

## 2023-06-04 DIAGNOSIS — F32.A DEPRESSION, UNSPECIFIED: ICD-10-CM

## 2023-06-04 DIAGNOSIS — Z87.19 PERSONAL HISTORY OF OTHER DISEASES OF THE DIGESTIVE SYSTEM: ICD-10-CM

## 2023-06-04 DIAGNOSIS — Z95.5 PRESENCE OF CORONARY ANGIOPLASTY IMPLANT AND GRAFT: ICD-10-CM

## 2023-06-04 DIAGNOSIS — Z79.02 LONG TERM (CURRENT) USE OF ANTITHROMBOTICS/ANTIPLATELETS: ICD-10-CM

## 2023-06-04 LAB
ALBUMIN SERPL ELPH-MCNC: 3.6 G/DL — SIGNIFICANT CHANGE UP (ref 3.3–5)
ALP SERPL-CCNC: 92 U/L — SIGNIFICANT CHANGE UP (ref 40–120)
ALT FLD-CCNC: 31 U/L — SIGNIFICANT CHANGE UP (ref 12–78)
ANION GAP SERPL CALC-SCNC: 2 MMOL/L — LOW (ref 5–17)
APPEARANCE UR: CLEAR — SIGNIFICANT CHANGE UP
AST SERPL-CCNC: 23 U/L — SIGNIFICANT CHANGE UP (ref 15–37)
BACTERIA # UR AUTO: NEGATIVE — SIGNIFICANT CHANGE UP
BASOPHILS # BLD AUTO: 0.08 K/UL — SIGNIFICANT CHANGE UP (ref 0–0.2)
BASOPHILS NFR BLD AUTO: 0.9 % — SIGNIFICANT CHANGE UP (ref 0–2)
BILIRUB SERPL-MCNC: 0.4 MG/DL — SIGNIFICANT CHANGE UP (ref 0.2–1.2)
BILIRUB UR-MCNC: NEGATIVE — SIGNIFICANT CHANGE UP
BUN SERPL-MCNC: 23 MG/DL — SIGNIFICANT CHANGE UP (ref 7–23)
CALCIUM SERPL-MCNC: 9.3 MG/DL — SIGNIFICANT CHANGE UP (ref 8.5–10.1)
CHLORIDE SERPL-SCNC: 106 MMOL/L — SIGNIFICANT CHANGE UP (ref 96–108)
CO2 SERPL-SCNC: 30 MMOL/L — SIGNIFICANT CHANGE UP (ref 22–31)
COLOR SPEC: YELLOW — SIGNIFICANT CHANGE UP
COMMENT - URINE: SIGNIFICANT CHANGE UP
CREAT SERPL-MCNC: 0.89 MG/DL — SIGNIFICANT CHANGE UP (ref 0.5–1.3)
DIFF PNL FLD: ABNORMAL
EGFR: 74 ML/MIN/1.73M2 — SIGNIFICANT CHANGE UP
EOSINOPHIL # BLD AUTO: 0.44 K/UL — SIGNIFICANT CHANGE UP (ref 0–0.5)
EOSINOPHIL NFR BLD AUTO: 5.1 % — SIGNIFICANT CHANGE UP (ref 0–6)
EPI CELLS # UR: SIGNIFICANT CHANGE UP
GLUCOSE SERPL-MCNC: 226 MG/DL — HIGH (ref 70–99)
GLUCOSE UR QL: 50 MG/DL
HCT VFR BLD CALC: 32.8 % — LOW (ref 34.5–45)
HGB BLD-MCNC: 11 G/DL — LOW (ref 11.5–15.5)
IMM GRANULOCYTES NFR BLD AUTO: 0.1 % — SIGNIFICANT CHANGE UP (ref 0–0.9)
KETONES UR-MCNC: NEGATIVE — SIGNIFICANT CHANGE UP
LEUKOCYTE ESTERASE UR-ACNC: ABNORMAL
LIDOCAIN IGE QN: 377 U/L — SIGNIFICANT CHANGE UP (ref 73–393)
LYMPHOCYTES # BLD AUTO: 3.57 K/UL — HIGH (ref 1–3.3)
LYMPHOCYTES # BLD AUTO: 41.2 % — SIGNIFICANT CHANGE UP (ref 13–44)
MCHC RBC-ENTMCNC: 28.5 PG — SIGNIFICANT CHANGE UP (ref 27–34)
MCHC RBC-ENTMCNC: 33.5 GM/DL — SIGNIFICANT CHANGE UP (ref 32–36)
MCV RBC AUTO: 85 FL — SIGNIFICANT CHANGE UP (ref 80–100)
MONOCYTES # BLD AUTO: 0.46 K/UL — SIGNIFICANT CHANGE UP (ref 0–0.9)
MONOCYTES NFR BLD AUTO: 5.3 % — SIGNIFICANT CHANGE UP (ref 2–14)
NEUTROPHILS # BLD AUTO: 4.11 K/UL — SIGNIFICANT CHANGE UP (ref 1.8–7.4)
NEUTROPHILS NFR BLD AUTO: 47.4 % — SIGNIFICANT CHANGE UP (ref 43–77)
NITRITE UR-MCNC: NEGATIVE — SIGNIFICANT CHANGE UP
PH UR: 6 — SIGNIFICANT CHANGE UP (ref 5–8)
PLATELET # BLD AUTO: 264 K/UL — SIGNIFICANT CHANGE UP (ref 150–400)
POTASSIUM SERPL-MCNC: 5 MMOL/L — SIGNIFICANT CHANGE UP (ref 3.5–5.3)
POTASSIUM SERPL-SCNC: 5 MMOL/L — SIGNIFICANT CHANGE UP (ref 3.5–5.3)
PROT SERPL-MCNC: 7.5 GM/DL — SIGNIFICANT CHANGE UP (ref 6–8.3)
PROT UR-MCNC: 100
RBC # BLD: 3.86 M/UL — SIGNIFICANT CHANGE UP (ref 3.8–5.2)
RBC # FLD: 12.8 % — SIGNIFICANT CHANGE UP (ref 10.3–14.5)
RBC CASTS # UR COMP ASSIST: SIGNIFICANT CHANGE UP /HPF (ref 0–4)
SODIUM SERPL-SCNC: 138 MMOL/L — SIGNIFICANT CHANGE UP (ref 135–145)
SP GR SPEC: 1.01 — SIGNIFICANT CHANGE UP (ref 1.01–1.02)
TROPONIN I, HIGH SENSITIVITY RESULT: 10.7 NG/L — SIGNIFICANT CHANGE UP
UROBILINOGEN FLD QL: NEGATIVE — SIGNIFICANT CHANGE UP
WBC # BLD: 8.67 K/UL — SIGNIFICANT CHANGE UP (ref 3.8–10.5)
WBC # FLD AUTO: 8.67 K/UL — SIGNIFICANT CHANGE UP (ref 3.8–10.5)
WBC UR QL: ABNORMAL /HPF (ref 0–5)

## 2023-06-04 PROCEDURE — 85025 COMPLETE CBC W/AUTO DIFF WBC: CPT

## 2023-06-04 PROCEDURE — 99284 EMERGENCY DEPT VISIT MOD MDM: CPT

## 2023-06-04 PROCEDURE — 96374 THER/PROPH/DIAG INJ IV PUSH: CPT | Mod: XU

## 2023-06-04 PROCEDURE — 74177 CT ABD & PELVIS W/CONTRAST: CPT | Mod: MA

## 2023-06-04 PROCEDURE — 93010 ELECTROCARDIOGRAM REPORT: CPT

## 2023-06-04 PROCEDURE — 93005 ELECTROCARDIOGRAM TRACING: CPT

## 2023-06-04 PROCEDURE — 74177 CT ABD & PELVIS W/CONTRAST: CPT | Mod: 26,MA

## 2023-06-04 PROCEDURE — 81001 URINALYSIS AUTO W/SCOPE: CPT

## 2023-06-04 PROCEDURE — 84484 ASSAY OF TROPONIN QUANT: CPT

## 2023-06-04 PROCEDURE — 80053 COMPREHEN METABOLIC PANEL: CPT

## 2023-06-04 PROCEDURE — 99285 EMERGENCY DEPT VISIT HI MDM: CPT | Mod: 25

## 2023-06-04 PROCEDURE — 83690 ASSAY OF LIPASE: CPT

## 2023-06-04 PROCEDURE — 36415 COLL VENOUS BLD VENIPUNCTURE: CPT

## 2023-06-04 RX ORDER — METOPROLOL TARTRATE 50 MG
25 TABLET ORAL ONCE
Refills: 0 | Status: COMPLETED | OUTPATIENT
Start: 2023-06-04 | End: 2023-06-04

## 2023-06-04 RX ORDER — LOSARTAN POTASSIUM 100 MG/1
100 TABLET, FILM COATED ORAL ONCE
Refills: 0 | Status: COMPLETED | OUTPATIENT
Start: 2023-06-04 | End: 2023-06-04

## 2023-06-04 RX ORDER — SODIUM CHLORIDE 9 MG/ML
1000 INJECTION INTRAMUSCULAR; INTRAVENOUS; SUBCUTANEOUS ONCE
Refills: 0 | Status: COMPLETED | OUTPATIENT
Start: 2023-06-04 | End: 2023-06-04

## 2023-06-04 RX ORDER — CEFTRIAXONE 500 MG/1
1000 INJECTION, POWDER, FOR SOLUTION INTRAMUSCULAR; INTRAVENOUS ONCE
Refills: 0 | Status: COMPLETED | OUTPATIENT
Start: 2023-06-04 | End: 2023-06-04

## 2023-06-04 RX ORDER — GABAPENTIN 400 MG/1
600 CAPSULE ORAL ONCE
Refills: 0 | Status: COMPLETED | OUTPATIENT
Start: 2023-06-04 | End: 2023-06-04

## 2023-06-04 RX ORDER — CEFTRIAXONE 500 MG/1
1000 INJECTION, POWDER, FOR SOLUTION INTRAMUSCULAR; INTRAVENOUS ONCE
Refills: 0 | Status: DISCONTINUED | OUTPATIENT
Start: 2023-06-04 | End: 2023-06-04

## 2023-06-04 RX ADMIN — GABAPENTIN 600 MILLIGRAM(S): 400 CAPSULE ORAL at 21:35

## 2023-06-04 RX ADMIN — LOSARTAN POTASSIUM 100 MILLIGRAM(S): 100 TABLET, FILM COATED ORAL at 21:36

## 2023-06-04 RX ADMIN — SODIUM CHLORIDE 1000 MILLILITER(S): 9 INJECTION INTRAMUSCULAR; INTRAVENOUS; SUBCUTANEOUS at 21:35

## 2023-06-04 RX ADMIN — CEFTRIAXONE 1000 MILLIGRAM(S): 500 INJECTION, POWDER, FOR SOLUTION INTRAMUSCULAR; INTRAVENOUS at 23:50

## 2023-06-04 RX ADMIN — Medication 25 MILLIGRAM(S): at 21:36

## 2023-06-04 NOTE — ED STATDOCS - OBJECTIVE STATEMENT
64 y/o female with PMHx of CAD s/p 2 stents on Plavix, pulmonary edema, diverticulitis, DM2, HTN, HLD presents to the ED c/o abdominal pain x2 weeks and constipation x2 days. Patient states she has pain with bowel movements. Patient takes Gabapentin, Losartan, Plavix, Metoprolol.

## 2023-06-04 NOTE — ED STATDOCS - PATIENT PORTAL LINK FT
You can access the FollowMyHealth Patient Portal offered by [facility] by registering at the following website: [email or web address]. By joining [employer]’s FollowMyHealth portal, you will also be able to view your health information using other applications (apps) compatible with our system.

## 2023-06-04 NOTE — ED STATDOCS - PROGRESS NOTE DETAILS
pt with  2 negative troponins, a uti will treat with bactrim and abundant stool will treat with miralax and d/c  B Lay DO

## 2023-06-04 NOTE — ED STATDOCS - TEMPLATE, MLM
1. Have you had increased asthma symptoms (chest tightness, increased cough,         wheezing or felt short of breath) in the past week? No    2. Have you had a marked increase in allergy symptoms(itchy eyes or nose, sneezing, runny nose, post nasal drip or throat clearing) in the past week? No    3. Do you have a cold, respiratory tract infection, or flu-like symptoms? No    4. Did you have any problems such as increased allergy or asthma symptoms, hives or generalized itching within 12 hours of receiving your allergy injection or swelling that persisted into the next day? No    5. Are you on any new medications such as eye drops, blood pressure or migraine medication?  No    Please specify:     6. Patient was seen by allergist in the last 12 months?  Yes    7. Are you taking your allergy medicine as prescribed? Yes    8. Do you have your Epi kit with you? Yes    9. Epi expiration date:  6/27/20    Staff notes:       General

## 2023-06-04 NOTE — ED ADULT TRIAGE NOTE - CHIEF COMPLAINT QUOTE
Patient presents to ED ambulatory c/o abdominal pain and b/l back pain that started . Pt states that her left back hurts more than her right. Pt did not take any medications for pain today. Pt states she saw her PCP 2 weeks ago and was advised to get a sonogram but could not wait longer. denies urinary symptoms

## 2023-06-04 NOTE — ED STATDOCS - CLINICAL SUMMARY MEDICAL DECISION MAKING FREE TEXT BOX
Patient with LLQ and left flank abdominal pain. Will do CT abd/pelvis r/o colitis vs obstructive uropathy. Labs, and UA r/o UTI vs. pyelo.

## 2023-06-04 NOTE — ED STATDOCS - NSFOLLOWUPINSTRUCTIONS_ED_ALL_ED_FT
Urinary Tract Infection in Older Adults    AMBULATORY CARE:    A urinary tract infection (UTI) is caused by bacteria that get inside your urinary tract. Your urinary tract includes your kidneys, ureters, bladder, and urethra. A UTI is more common in your lower urinary tract, which includes your bladder and urethra.  Kidney, Ureters, Bladder    Common signs and symptoms include the following:    Fever and chills    Pain or burning when you urinate    Urine that smells bad or looks cloudy, or blood in your urine    Urinating more often or waking from sleep to urinate    Sudden, strong need to urinate    Pain or pressure in your lower abdomen    Leaking urine    Confusion or agitation    Fatigue, shakiness, and weakness  Seek care immediately if:    You become confused or agitated.    You fall down.    You are urinating very little or not at all.    You are vomiting.    You have a high fever with shaking chills.    You have side or back pain that gets worse.  Call your doctor if:    You have a fever.    You are a woman and you have increased white or yellow discharge from your vagina.    You do not feel better after 2 days of taking antibiotics.    You have questions or concerns about your condition or care.  Treatment: Medicines treat the bacterial infection or decrease pain and burning when you urinate. You may also need medicines to decrease the urge to urinate often. If you have UTIs often (called recurrent UTIs), you may be given antibiotics to take regularly. You will be given directions for when and how to use antibiotics. The goal is to prevent UTIs but not cause antibiotic resistance by using antibiotics too often.    Self-care:    Drink liquids as directed. Liquids can help flush bacteria from your urinary tract. Ask how much liquid to drink each day and which liquids are best for you. You may need to drink more liquids than usual to help flush out the bacteria. Do not drink alcohol, caffeine, and citrus juices. These can irritate your bladder and increase your symptoms.    Apply heat on your abdomen for 20 to 30 minutes every 2 hours for as many days as directed. Heat helps decrease discomfort and pressure in your bladder.  Prevent a UTI:    Urinate when you feel the urge. Do not hold your urine. Bacteria can grow if urine stays in the bladder too long. It may be helpful to urinate at least every 3 to 4 hours.    Urinate after you have sex. This will help flush away bacteria that can enter your urinary tract during sex.    Wear cotton underwear and clothes that are loose. Tight pants and nylon underwear can trap moisture and cause bacteria to grow.    Drink cranberry juice or take cranberry supplements. These may help prevent UTIs. Your healthcare provider can recommend the right juice or supplement for you.    Women should wipe front to back after urinating or having a bowel movement. This may prevent germs from getting into the urinary tract. Do not douche or use feminine deodorants. These can change the chemical balance in your vagina. You may also be given vaginal estrogen medicine. This medicine helps prevent recurrent UTIs in women who have gone through menopause or are in paul-menopause.  Follow up with your doctor as directed: Write down your questions so you remember to ask them during your visits.    © [employer] 1973, 2023    	  back to top            © [employer] 1973, 2023

## 2023-06-04 NOTE — ED ADULT NURSE NOTE - NSSUHOSCREENINGYN_ED_ALL_ED
Yes - the patient is able to be screened
I will STOP taking the medications listed below when I get home from the hospital:  None

## 2023-06-04 NOTE — ED ADULT NURSE NOTE - NSFALLUNIVINTERV_ED_ALL_ED
Bed/Stretcher in lowest position, wheels locked, appropriate side rails in place/Call bell, personal items and telephone in reach/Instruct patient to call for assistance before getting out of bed/chair/stretcher/Non-slip footwear applied when patient is off stretcher/[place] to call system/Physically safe environment - no spills, clutter or unnecessary equipment/Purposeful proactive rounding/Room/bathroom lighting operational, light cord in reach

## 2023-06-04 NOTE — ED STATDOCS - ATTENDING APP SHARED VISIT CONTRIBUTION OF CARE
Dr. Chun: I performed a face to face bedside interview with patient regarding history of present illness, review of symptoms and past medical history. I completed an independent physical exam.  I have discussed patient's plan of care with PA.   I agree with note as stated above, having amended the EMR as needed to reflect my findings.   This includes HISTORY OF PRESENT ILLNESS, HIV, PAST MEDICAL/SURGICAL/FAMILY/SOCIAL HISTORY, ALLERGIES AND HOME MEDICATIONS, REVIEW OF SYSTEMS, PHYSICAL EXAM, and any PROGRESS NOTES during the time I functioned as the attending physician for this patient.  RISSA Chun DO

## 2023-06-05 VITALS
TEMPERATURE: 98 F | DIASTOLIC BLOOD PRESSURE: 63 MMHG | HEART RATE: 70 BPM | SYSTOLIC BLOOD PRESSURE: 158 MMHG | OXYGEN SATURATION: 100 % | RESPIRATION RATE: 18 BRPM

## 2023-06-05 LAB — TROPONIN I, HIGH SENSITIVITY RESULT: 11.81 NG/L — SIGNIFICANT CHANGE UP

## 2023-06-05 RX ORDER — MAGNESIUM HYDROXIDE 400 MG/1
30 TABLET, CHEWABLE ORAL ONCE
Refills: 0 | Status: COMPLETED | OUTPATIENT
Start: 2023-06-05 | End: 2023-06-05

## 2023-06-05 RX ORDER — POLYETHYLENE GLYCOL 3350 17 G/17G
17 POWDER, FOR SOLUTION ORAL ONCE
Refills: 0 | Status: COMPLETED | OUTPATIENT
Start: 2023-06-05 | End: 2023-06-05

## 2023-06-05 RX ORDER — POLYETHYLENE GLYCOL 3350 17 G/17G
17 POWDER, FOR SOLUTION ORAL
Qty: 1 | Refills: 0
Start: 2023-06-05 | End: 2023-06-18

## 2023-06-05 RX ADMIN — MAGNESIUM HYDROXIDE 30 MILLILITER(S): 400 TABLET, CHEWABLE ORAL at 00:56

## 2023-06-05 RX ADMIN — POLYETHYLENE GLYCOL 3350 17 GRAM(S): 17 POWDER, FOR SOLUTION ORAL at 00:56

## 2024-03-01 ENCOUNTER — EMERGENCY (EMERGENCY)
Facility: HOSPITAL | Age: 64
LOS: 0 days | Discharge: HOME | End: 2024-03-02
Attending: EMERGENCY MEDICINE
Payer: MEDICAID

## 2024-03-01 VITALS — WEIGHT: 179.9 LBS

## 2024-03-01 DIAGNOSIS — I25.10 ATHEROSCLEROTIC HEART DISEASE OF NATIVE CORONARY ARTERY WITHOUT ANGINA PECTORIS: ICD-10-CM

## 2024-03-01 DIAGNOSIS — E78.5 HYPERLIPIDEMIA, UNSPECIFIED: ICD-10-CM

## 2024-03-01 DIAGNOSIS — Z87.19 PERSONAL HISTORY OF OTHER DISEASES OF THE DIGESTIVE SYSTEM: ICD-10-CM

## 2024-03-01 DIAGNOSIS — R50.9 FEVER, UNSPECIFIED: ICD-10-CM

## 2024-03-01 DIAGNOSIS — R06.02 SHORTNESS OF BREATH: ICD-10-CM

## 2024-03-01 DIAGNOSIS — R05.9 COUGH, UNSPECIFIED: ICD-10-CM

## 2024-03-01 DIAGNOSIS — Z95.5 PRESENCE OF CORONARY ANGIOPLASTY IMPLANT AND GRAFT: ICD-10-CM

## 2024-03-01 DIAGNOSIS — Z79.02 LONG TERM (CURRENT) USE OF ANTITHROMBOTICS/ANTIPLATELETS: ICD-10-CM

## 2024-03-01 DIAGNOSIS — R79.89 OTHER SPECIFIED ABNORMAL FINDINGS OF BLOOD CHEMISTRY: ICD-10-CM

## 2024-03-01 DIAGNOSIS — I10 ESSENTIAL (PRIMARY) HYPERTENSION: ICD-10-CM

## 2024-03-01 DIAGNOSIS — E11.9 TYPE 2 DIABETES MELLITUS WITHOUT COMPLICATIONS: ICD-10-CM

## 2024-03-01 DIAGNOSIS — Z95.5 PRESENCE OF CORONARY ANGIOPLASTY IMPLANT AND GRAFT: Chronic | ICD-10-CM

## 2024-03-01 DIAGNOSIS — J18.9 PNEUMONIA, UNSPECIFIED ORGANISM: ICD-10-CM

## 2024-03-01 LAB
ALBUMIN SERPL ELPH-MCNC: 3.8 G/DL — SIGNIFICANT CHANGE UP (ref 3.3–5)
ALP SERPL-CCNC: 37 U/L — LOW (ref 40–120)
ALT FLD-CCNC: 21 U/L — SIGNIFICANT CHANGE UP (ref 12–78)
ANION GAP SERPL CALC-SCNC: 5 MMOL/L — SIGNIFICANT CHANGE UP (ref 5–17)
AST SERPL-CCNC: 23 U/L — SIGNIFICANT CHANGE UP (ref 15–37)
BASOPHILS # BLD AUTO: 0.03 K/UL — SIGNIFICANT CHANGE UP (ref 0–0.2)
BASOPHILS NFR BLD AUTO: 0.3 % — SIGNIFICANT CHANGE UP (ref 0–2)
BILIRUB SERPL-MCNC: 0.6 MG/DL — SIGNIFICANT CHANGE UP (ref 0.2–1.2)
BUN SERPL-MCNC: 18 MG/DL — SIGNIFICANT CHANGE UP (ref 7–23)
CALCIUM SERPL-MCNC: 9.4 MG/DL — SIGNIFICANT CHANGE UP (ref 8.5–10.1)
CHLORIDE SERPL-SCNC: 105 MMOL/L — SIGNIFICANT CHANGE UP (ref 96–108)
CO2 SERPL-SCNC: 26 MMOL/L — SIGNIFICANT CHANGE UP (ref 22–31)
CREAT SERPL-MCNC: 1.41 MG/DL — HIGH (ref 0.5–1.3)
EGFR: 42 ML/MIN/1.73M2 — LOW
EOSINOPHIL # BLD AUTO: 0.07 K/UL — SIGNIFICANT CHANGE UP (ref 0–0.5)
EOSINOPHIL NFR BLD AUTO: 0.8 % — SIGNIFICANT CHANGE UP (ref 0–6)
GLUCOSE SERPL-MCNC: 273 MG/DL — HIGH (ref 70–99)
HCT VFR BLD CALC: 31.4 % — LOW (ref 34.5–45)
HGB BLD-MCNC: 10.4 G/DL — LOW (ref 11.5–15.5)
IMM GRANULOCYTES NFR BLD AUTO: 0.3 % — SIGNIFICANT CHANGE UP (ref 0–0.9)
LYMPHOCYTES # BLD AUTO: 1.79 K/UL — SIGNIFICANT CHANGE UP (ref 1–3.3)
LYMPHOCYTES # BLD AUTO: 20 % — SIGNIFICANT CHANGE UP (ref 13–44)
MCHC RBC-ENTMCNC: 28.3 PG — SIGNIFICANT CHANGE UP (ref 27–34)
MCHC RBC-ENTMCNC: 33.1 GM/DL — SIGNIFICANT CHANGE UP (ref 32–36)
MCV RBC AUTO: 85.6 FL — SIGNIFICANT CHANGE UP (ref 80–100)
MONOCYTES # BLD AUTO: 0.64 K/UL — SIGNIFICANT CHANGE UP (ref 0–0.9)
MONOCYTES NFR BLD AUTO: 7.2 % — SIGNIFICANT CHANGE UP (ref 2–14)
NEUTROPHILS # BLD AUTO: 6.39 K/UL — SIGNIFICANT CHANGE UP (ref 1.8–7.4)
NEUTROPHILS NFR BLD AUTO: 71.4 % — SIGNIFICANT CHANGE UP (ref 43–77)
PLATELET # BLD AUTO: 349 K/UL — SIGNIFICANT CHANGE UP (ref 150–400)
POTASSIUM SERPL-MCNC: 4.5 MMOL/L — SIGNIFICANT CHANGE UP (ref 3.5–5.3)
POTASSIUM SERPL-SCNC: 4.5 MMOL/L — SIGNIFICANT CHANGE UP (ref 3.5–5.3)
PROT SERPL-MCNC: 7.6 GM/DL — SIGNIFICANT CHANGE UP (ref 6–8.3)
RBC # BLD: 3.67 M/UL — LOW (ref 3.8–5.2)
RBC # FLD: 13 % — SIGNIFICANT CHANGE UP (ref 10.3–14.5)
SODIUM SERPL-SCNC: 136 MMOL/L — SIGNIFICANT CHANGE UP (ref 135–145)
WBC # BLD: 8.95 K/UL — SIGNIFICANT CHANGE UP (ref 3.8–10.5)
WBC # FLD AUTO: 8.95 K/UL — SIGNIFICANT CHANGE UP (ref 3.8–10.5)

## 2024-03-01 PROCEDURE — 71046 X-RAY EXAM CHEST 2 VIEWS: CPT | Mod: 26

## 2024-03-01 PROCEDURE — 99285 EMERGENCY DEPT VISIT HI MDM: CPT

## 2024-03-01 PROCEDURE — 80053 COMPREHEN METABOLIC PANEL: CPT

## 2024-03-01 PROCEDURE — 71046 X-RAY EXAM CHEST 2 VIEWS: CPT

## 2024-03-01 PROCEDURE — 99283 EMERGENCY DEPT VISIT LOW MDM: CPT | Mod: 25

## 2024-03-01 PROCEDURE — 94640 AIRWAY INHALATION TREATMENT: CPT

## 2024-03-01 PROCEDURE — 87040 BLOOD CULTURE FOR BACTERIA: CPT

## 2024-03-01 PROCEDURE — 36415 COLL VENOUS BLD VENIPUNCTURE: CPT

## 2024-03-01 PROCEDURE — 85025 COMPLETE CBC W/AUTO DIFF WBC: CPT

## 2024-03-01 RX ORDER — AZITHROMYCIN 500 MG/1
500 TABLET, FILM COATED ORAL ONCE
Refills: 0 | Status: COMPLETED | OUTPATIENT
Start: 2024-03-01 | End: 2024-03-01

## 2024-03-01 RX ORDER — AZITHROMYCIN 500 MG/1
1 TABLET, FILM COATED ORAL
Qty: 1 | Refills: 0
Start: 2024-03-01 | End: 2024-03-04

## 2024-03-01 RX ORDER — ALBUTEROL 90 UG/1
1 AEROSOL, METERED ORAL ONCE
Refills: 0 | Status: COMPLETED | OUTPATIENT
Start: 2024-03-01 | End: 2024-03-01

## 2024-03-01 RX ADMIN — ALBUTEROL 1 PUFF(S): 90 AEROSOL, METERED ORAL at 23:47

## 2024-03-01 RX ADMIN — Medication 1 TABLET(S): at 23:47

## 2024-03-01 RX ADMIN — AZITHROMYCIN 500 MILLIGRAM(S): 500 TABLET, FILM COATED ORAL at 23:47

## 2024-03-01 NOTE — ED STATDOCS - PHYSICAL EXAMINATION
GEN - NAD; well appearing; A+O x3  HEAD - NC/AT    EYES - EOMI, no conjunctival pallor, no scleral icterus  ENT -   Near edentulous with some tooth stumps no obvious fluctuance or collection of the gums.  PULM - Bilateral wheezes  COR -  RRR, S1 S2, no murmurs  ABD - ND, NT, soft, no guarding, no rebound, no masses    EXTREMS -no edema, no deformity, warm and well perfused   SKIN - no rash or bruising      NEUROLOGIC - alert, sensation nl, motor 5/5 RUE/LUE/RLE/LLE

## 2024-03-01 NOTE — ED STATDOCS - CLINICAL SUMMARY MEDICAL DECISION MAKING FREE TEXT BOX
Patient with URI symptoms as well as recent dental infection presenting with persistent cough, wheezes on exam, will x-ray rule out pneumonia, will trial albuterol, flu COVID swab and reassess. Patient with URI symptoms as well as recent dental infection presenting with persistent cough, wheezes on exam, will x-ray rule out pneumonia, will trial albuterol, flu COVID swab and reassess.    signed Ayse Morgan PA-C Pt seen initially in intake by Dr. Mathews.   63F with cough. cxr with RUL infiltrate, will DC on PO abx for PNA. augmentin azithro. labs with elevated creatinine 1.4, baseline 0.8. f/u PMD for PNA and recheck labs.

## 2024-03-01 NOTE — ED STATDOCS - PATIENT PORTAL LINK FT
You can access the FollowMyHealth Patient Portal offered by [facility] by registering at the following website: [email or web address]. By joining [facility]’s FollowMyHealth portal, you will also be able to view your health information using other applications (apps) compatible with our system.

## 2024-03-01 NOTE — ED ADULT TRIAGE NOTE - CHIEF COMPLAINT QUOTE
pt ambulatory to [facility] c/o flu like symptoms x2 weeks. pt reports fever (TMAX 102), cough, n/v. pt currently taking amoxicillin s/p dental removal. PMH COPD, diabetes, NKDA.

## 2024-03-01 NOTE — ED STATDOCS - NSFOLLOWUPINSTRUCTIONS_ED_ALL_ED_FT
FOLLOW UP WITH YOUR DOCTOR THIS WEEK. CALL THE OFFICE TO MAKE AN APPOINTMENT.  GET YOUR KIDNEY NUMBERS RECHECKED. RETURN TO ER FOR ANY WORSENING SYMPTOMS OR NEW CONCERNS.     Pneumonia    WHAT YOU NEED TO KNOW:    Pneumonia is an infection in your lungs caused by bacteria, viruses, fungi, or parasites. You can become infected if you come in contact with someone who is sick. You can get pneumonia if you recently had surgery or needed a ventilator to help you breathe. Pneumonia can also be caused by accidentally inhaling saliva or small pieces of food. Pneumonia may cause mild symptoms, or it can be severe and life-threatening. The Lungs         DISCHARGE INSTRUCTIONS:    Return to the emergency department if:     You cough up blood.       Your heart beats more than 100 beats in 1 minute.       You are very tired, confused, and cannot think clearly.      You have chest pain or trouble breathing.       Your lips or fingernails turn gray or blue.     Contact your healthcare provider if:     Your symptoms are the same or get worse 48 hours after you start antibiotics.      Your fever is not below 99°F (37.2°C) 48 hours after you start antibiotics.       You have a fever higher than 101°F (38.3°C).       You cannot eat, or you have loss of appetite, nausea, or are vomiting.      You have questions or concerns about your condition or care.    Medicines:     Antibiotics treat pneumonia caused by bacteria.      Acetaminophen decreases pain and fever. It is available without a doctor's order. Ask how much to take and how often to take it. Follow directions. Read the labels of all other medicines you are using to see if they also contain acetaminophen, or ask your doctor or pharmacist. Acetaminophen can cause liver damage if not taken correctly. Do not use more than 4 grams (4,000 milligrams) total of acetaminophen in one day.       NSAIDs, such as ibuprofen, help decrease swelling, pain, and fever. This medicine is available with or without a doctor's order. NSAIDs can cause stomach bleeding or kidney problems in certain people. If you take blood thinner medicine, always ask your healthcare provider if NSAIDs are safe for you. Always read the medicine label and follow directions.      Take your medicine as directed. Contact your healthcare provider if you think your medicine is not helping or if you have side effects. Tell him or her if you are allergic to any medicine. Keep a list of the medicines, vitamins, and herbs you take. Include the amounts, and when and why you take them. Bring the list or the pill bottles to follow-up visits. Carry your medicine list with you in case of an emergency.    Follow up with your healthcare provider as directed: You will need to return for more tests. Write down your questions so you remember to ask them during your visits.     Manage your symptoms:     Rest as needed. Rest often throughout the day. Alternate times of activity with times of rest.      Drink liquids as directed. Ask how much liquid to drink each day and which liquids are best for you. Liquids help thin your mucus, which may make it easier for you to cough it up.       Do not smoke. Avoid secondhand smoke. Smoking increases your risk for pneumonia. Smoking also makes it harder for you to get better after you have had pneumonia. Ask your healthcare provider for information if you need help to quit smoking.       Use a cool mist humidifier. A humidifier will help increase air moisture in your home. This may make it easier for you to breathe and help decrease your cough.       Keep your head elevated. You may be able to breathe better if you lie down with the head of your bed up.     Prevent pneumonia:     Prevent the spread of germs. Wash your hands often with soap and water. Use gel hand cleanser when there is no soap and water available. Do not touch your eyes, nose, or mouth unless you have washed your hands first. Cover your mouth when you cough. Cough into a tissue or your shirtsleeve so you do not spread germs from your hands. If you are sick, stay away from others as much as possible. Handwashing           Limit alcohol. Women should limit alcohol to 1 drink a day. Men should limit alcohol to 2 drinks a day. A drink of alcohol is 12 ounces of beer, 5 ounces of wine, or 1½ ounces of liquor.      Ask about vaccines. You may need a vaccine to help prevent pneumonia. Get an influenza (flu) vaccine every year as soon as it becomes available.

## 2024-03-01 NOTE — ED STATDOCS - OBJECTIVE STATEMENT
64yo f pmh  of CAD s/p 2 stents on Plavix, pulmonary edema, diverticulitis, DM2, HTN, HLD, Presents with cough x 2 weeks.  Last fever 5 days ago.  Some shortness of breath.  Patient also notes she had dental work done with a bridge infection and was taking amoxicillin but recently completed the antibiotics.

## 2024-03-02 VITALS
HEART RATE: 80 BPM | TEMPERATURE: 98 F | DIASTOLIC BLOOD PRESSURE: 54 MMHG | RESPIRATION RATE: 17 BRPM | OXYGEN SATURATION: 99 % | SYSTOLIC BLOOD PRESSURE: 148 MMHG

## 2024-03-02 NOTE — ED ADULT NURSE NOTE - CHIEF COMPLAINT QUOTE
pt ambulatory to [facility] c/o flu like symptoms x2 weeks. pt reports fever (TMAX 102), cough, n/v. pt currently taking amoxicillin s/p dental removal. PMH COPD, diabetes, NKDA.

## 2024-03-02 NOTE — ED ADULT NURSE NOTE - NSFALLUNIVINTERV_ED_ALL_ED
Bed/Stretcher in lowest position, wheels locked, appropriate side rails in place/Call bell, personal items and telephone in reach/Instruct patient to call for assistance before getting out of bed/chair/stretcher/Non-slip footwear applied when patient is off stretcher/[place] to call system/Physically safe environment - no spills, clutter or unnecessary equipment/Purposeful proactive rounding/Room/bathroom lighting operational, light cord in reach

## 2024-03-07 LAB
CULTURE RESULTS: SIGNIFICANT CHANGE UP
CULTURE RESULTS: SIGNIFICANT CHANGE UP
SPECIMEN SOURCE: SIGNIFICANT CHANGE UP
SPECIMEN SOURCE: SIGNIFICANT CHANGE UP

## 2024-03-12 ENCOUNTER — EMERGENCY (EMERGENCY)
Facility: HOSPITAL | Age: 64
LOS: 0 days | Discharge: HOME | End: 2024-03-12
Attending: STUDENT IN AN ORGANIZED HEALTH CARE EDUCATION/TRAINING PROGRAM
Payer: MEDICAID

## 2024-03-12 VITALS
OXYGEN SATURATION: 99 % | DIASTOLIC BLOOD PRESSURE: 89 MMHG | SYSTOLIC BLOOD PRESSURE: 154 MMHG | RESPIRATION RATE: 17 BRPM | TEMPERATURE: 98 F | HEART RATE: 63 BPM

## 2024-03-12 VITALS — WEIGHT: 149.91 LBS

## 2024-03-12 DIAGNOSIS — E11.9 TYPE 2 DIABETES MELLITUS WITHOUT COMPLICATIONS: ICD-10-CM

## 2024-03-12 DIAGNOSIS — I10 ESSENTIAL (PRIMARY) HYPERTENSION: ICD-10-CM

## 2024-03-12 DIAGNOSIS — Z95.5 PRESENCE OF CORONARY ANGIOPLASTY IMPLANT AND GRAFT: Chronic | ICD-10-CM

## 2024-03-12 DIAGNOSIS — R06.02 SHORTNESS OF BREATH: ICD-10-CM

## 2024-03-12 DIAGNOSIS — R05.3 CHRONIC COUGH: ICD-10-CM

## 2024-03-12 DIAGNOSIS — R05.8 OTHER SPECIFIED COUGH: ICD-10-CM

## 2024-03-12 LAB
ALBUMIN SERPL ELPH-MCNC: 3.3 G/DL — SIGNIFICANT CHANGE UP (ref 3.3–5)
ALP SERPL-CCNC: 54 U/L — SIGNIFICANT CHANGE UP (ref 40–120)
ALT FLD-CCNC: 15 U/L — SIGNIFICANT CHANGE UP (ref 12–78)
ANION GAP SERPL CALC-SCNC: 7 MMOL/L — SIGNIFICANT CHANGE UP (ref 5–17)
AST SERPL-CCNC: 18 U/L — SIGNIFICANT CHANGE UP (ref 15–37)
BASE EXCESS BLDV CALC-SCNC: -2.2 MMOL/L — LOW (ref -2–3)
BASOPHILS # BLD AUTO: 0.04 K/UL — SIGNIFICANT CHANGE UP (ref 0–0.2)
BASOPHILS NFR BLD AUTO: 0.5 % — SIGNIFICANT CHANGE UP (ref 0–2)
BILIRUB SERPL-MCNC: 0.4 MG/DL — SIGNIFICANT CHANGE UP (ref 0.2–1.2)
BUN SERPL-MCNC: 19 MG/DL — SIGNIFICANT CHANGE UP (ref 7–23)
CALCIUM SERPL-MCNC: 9.1 MG/DL — SIGNIFICANT CHANGE UP (ref 8.5–10.1)
CHLORIDE SERPL-SCNC: 106 MMOL/L — SIGNIFICANT CHANGE UP (ref 96–108)
CO2 SERPL-SCNC: 24 MMOL/L — SIGNIFICANT CHANGE UP (ref 22–31)
CREAT SERPL-MCNC: 1.23 MG/DL — SIGNIFICANT CHANGE UP (ref 0.5–1.3)
EGFR: 50 ML/MIN/1.73M2 — LOW
EOSINOPHIL # BLD AUTO: 0.21 K/UL — SIGNIFICANT CHANGE UP (ref 0–0.5)
EOSINOPHIL NFR BLD AUTO: 2.4 % — SIGNIFICANT CHANGE UP (ref 0–6)
GAS PNL BLDV: SIGNIFICANT CHANGE UP
GLUCOSE SERPL-MCNC: 240 MG/DL — HIGH (ref 70–99)
HCO3 BLDV-SCNC: 24 MMOL/L — SIGNIFICANT CHANGE UP (ref 22–29)
HCT VFR BLD CALC: 31.2 % — LOW (ref 34.5–45)
HGB BLD-MCNC: 10.2 G/DL — LOW (ref 11.5–15.5)
IMM GRANULOCYTES NFR BLD AUTO: 0.5 % — SIGNIFICANT CHANGE UP (ref 0–0.9)
LIDOCAIN IGE QN: 58 U/L — SIGNIFICANT CHANGE UP (ref 13–75)
LYMPHOCYTES # BLD AUTO: 2.32 K/UL — SIGNIFICANT CHANGE UP (ref 1–3.3)
LYMPHOCYTES # BLD AUTO: 26.7 % — SIGNIFICANT CHANGE UP (ref 13–44)
MAGNESIUM SERPL-MCNC: 1.6 MG/DL — SIGNIFICANT CHANGE UP (ref 1.6–2.6)
MCHC RBC-ENTMCNC: 28.4 PG — SIGNIFICANT CHANGE UP (ref 27–34)
MCHC RBC-ENTMCNC: 32.7 GM/DL — SIGNIFICANT CHANGE UP (ref 32–36)
MCV RBC AUTO: 86.9 FL — SIGNIFICANT CHANGE UP (ref 80–100)
MONOCYTES # BLD AUTO: 0.44 K/UL — SIGNIFICANT CHANGE UP (ref 0–0.9)
MONOCYTES NFR BLD AUTO: 5.1 % — SIGNIFICANT CHANGE UP (ref 2–14)
NEUTROPHILS # BLD AUTO: 5.63 K/UL — SIGNIFICANT CHANGE UP (ref 1.8–7.4)
NEUTROPHILS NFR BLD AUTO: 64.8 % — SIGNIFICANT CHANGE UP (ref 43–77)
NT-PROBNP SERPL-SCNC: 231 PG/ML — HIGH (ref 0–125)
PCO2 BLDV: 47 MMHG — HIGH (ref 39–42)
PH BLDV: 7.32 — SIGNIFICANT CHANGE UP (ref 7.32–7.43)
PLATELET # BLD AUTO: 255 K/UL — SIGNIFICANT CHANGE UP (ref 150–400)
PO2 BLDV: 51 MMHG — HIGH (ref 25–45)
POTASSIUM SERPL-MCNC: 4.9 MMOL/L — SIGNIFICANT CHANGE UP (ref 3.5–5.3)
POTASSIUM SERPL-SCNC: 4.9 MMOL/L — SIGNIFICANT CHANGE UP (ref 3.5–5.3)
PROT SERPL-MCNC: 7.4 GM/DL — SIGNIFICANT CHANGE UP (ref 6–8.3)
RBC # BLD: 3.59 M/UL — LOW (ref 3.8–5.2)
RBC # FLD: 13.3 % — SIGNIFICANT CHANGE UP (ref 10.3–14.5)
SAO2 % BLDV: 82 % — SIGNIFICANT CHANGE UP (ref 67–88)
SODIUM SERPL-SCNC: 137 MMOL/L — SIGNIFICANT CHANGE UP (ref 135–145)
TROPONIN I, HIGH SENSITIVITY RESULT: 6.55 NG/L — SIGNIFICANT CHANGE UP
WBC # BLD: 8.68 K/UL — SIGNIFICANT CHANGE UP (ref 3.8–10.5)
WBC # FLD AUTO: 8.68 K/UL — SIGNIFICANT CHANGE UP (ref 3.8–10.5)

## 2024-03-12 PROCEDURE — 71275 CT ANGIOGRAPHY CHEST: CPT | Mod: 26,MC

## 2024-03-12 PROCEDURE — 80053 COMPREHEN METABOLIC PANEL: CPT

## 2024-03-12 PROCEDURE — 99285 EMERGENCY DEPT VISIT HI MDM: CPT

## 2024-03-12 PROCEDURE — 36415 COLL VENOUS BLD VENIPUNCTURE: CPT

## 2024-03-12 PROCEDURE — 83690 ASSAY OF LIPASE: CPT

## 2024-03-12 PROCEDURE — 83880 ASSAY OF NATRIURETIC PEPTIDE: CPT

## 2024-03-12 PROCEDURE — 85025 COMPLETE CBC W/AUTO DIFF WBC: CPT

## 2024-03-12 PROCEDURE — 84484 ASSAY OF TROPONIN QUANT: CPT

## 2024-03-12 PROCEDURE — 93010 ELECTROCARDIOGRAM REPORT: CPT

## 2024-03-12 PROCEDURE — 71275 CT ANGIOGRAPHY CHEST: CPT | Mod: MC

## 2024-03-12 PROCEDURE — 83735 ASSAY OF MAGNESIUM: CPT

## 2024-03-12 PROCEDURE — 93005 ELECTROCARDIOGRAM TRACING: CPT

## 2024-03-12 PROCEDURE — 99285 EMERGENCY DEPT VISIT HI MDM: CPT | Mod: 25

## 2024-03-12 PROCEDURE — 82803 BLOOD GASES ANY COMBINATION: CPT

## 2024-03-12 RX ORDER — MORPHINE SULFATE 50 MG/1
4 CAPSULE, EXTENDED RELEASE ORAL ONCE
Refills: 0 | Status: DISCONTINUED | OUTPATIENT
Start: 2024-03-12 | End: 2024-03-12

## 2024-03-12 NOTE — ED ADULT TRIAGE NOTE - CHIEF COMPLAINT QUOTE
Pt presents to the ED c/o SOB. Pt reports being admitted for PNA 2 weeks ago, reports feeling SOB and chest pain, mostly at nights with cough. Pt also reports pain in her throat and difficulty swallowing.

## 2024-03-12 NOTE — ED PROVIDER NOTE - NSFOLLOWUPINSTRUCTIONS_ED_ALL_ED_FT
Chronic Cough    WHAT YOU NEED TO KNOW:    What is a chronic cough? A chronic cough is a cough that lasts more than 4 weeks in children or 8 weeks in adults.    What causes a chronic cough?    Smoking or exposure to secondhand smoke    Allergies    Acid reflux    Lung conditions such as asthma, COPD, lung cancer, or pneumonia    Medicines such as blood pressure or heart medicines    Conditions such as cystic fibrosis    Lung infections such as pertussis or tuberculosis  What other signs and symptoms might I have?    Wheezing and shortness of breath    A runny or stuffy nose    Pain or itching in your throat    Red, swollen, watery eyes    A raspy or hoarse voice    Heartburn or a sour taste in your mouth  How is a chronic cough diagnosed? Your healthcare provider will examine you and ask about your symptoms. Tell him or her about your medical conditions, medicines, and any recent respiratory infections. Tell him or her if you have ever smoked, currently smoke, or are exposed to secondhand smoke. You may need a chest x-ray to check for problems with your lungs. You may need other tests to find the cause of your chronic cough. This may include blood tests, lung function tests, or an endoscopy. Ask your healthcare provider for more information on these tests.    How is a chronic cough treated? The cough may go away on its own without treatment. You may need medicine to stop the cough or treat the cause of your cough. This may include medicine to treat allergies or acid reflux, or decrease swelling in your airways. You may also need antibiotics to treat a respiratory infection. If you take medicine that causes a chronic cough, it may be stopped or changed. You may need speech therapy. A speech therapist can teach you ways to control your cough.    What can I do to care for myself?    Prevent acid reflex. Acid reflux can make your chronic cough worse. Raise your head and upper back when you sleep. Place 2 or more pillows behind your head or sleep in a recliner. Do not lie down for at least 1 hour after you eat. Do not have foods or drinks that increase heartburn. Ask your healthcare provider for other ways to prevent acid reflux.  Prevent GERD      Do not smoke. Encourage your adolescent child not to smoke. Nicotine and other chemicals in cigarettes and cigars can cause lung damage. They can also make your cough worse. Ask your healthcare provider for information if you currently smoke and need help to quit. E-cigarettes or smokeless tobacco still contain nicotine. Talk to your healthcare provider before you use these products.    Stay away from secondhand smoke. Do not let people smoke in your car, home, or near your child. Do not stand near someone that is smoking. This includes anyone that is smoking an E-cigarrete.    Avoid anything that triggers your allergies or irritates your throat. Allergens and irritants can make your chronic cough worse. Allergens may include dust mites, pollen, pet dander, or mold. Wear a mask if you work around pollutants or irritants. Ask your healthcare provider for more ways to decrease your exposure to allergens or irritants.    Drink plenty of liquids as directed. Liquids may help relieve throat discomfort that causes you to cough. Add honey to tea or hot water to help ease your throat pain. Ask how much liquid to drink each day and which liquids are best for you.  Call your local emergency number (911 in the [place]) for any of the following:    You cough up blood.    You faint when you cough.    You have trouble breathing.  When should I call my doctor?    You have new or worsening symptoms.    You have severe pain when you take a deep breath.    You become very tired after a coughing fit.    You have trouble sleeping because of the coughing.    You have questions or concerns about your condition or care.  CARE AGREEMENT:    You have the right to help plan your care. Learn about your health condition and how it may be treated. Discuss treatment options with your healthcare providers to decide what care you want to receive. You always have the right to refuse treatment.

## 2024-03-12 NOTE — ED PROVIDER NOTE - OBJECTIVE STATEMENT
64F here with feeling sick with dry cough and shortness of breath for 1.5 months. was here March 1st, dx with PNA started on Augmentin and Doxy. pt says still not better and having pain with coughing. Hx DM, HTN. Non smoker.

## 2024-03-12 NOTE — ED PROVIDER NOTE - PROGRESS NOTE DETAILS
Lyles, DO: All labs reviewed and are nonactionable.  CTA reviewed, patient has limited study for PE but no segmental or main pulmonary artery emboli.  No evidence of pneumonia.  Patient reassessed, she is not hypoxic, no increased work of breathing.  This may be secondary to bronchitis.  Blood pressure improved since arrival.  Will send Tessalon Perles to primary pharmacy.  Discussed return precautions and all questions answered. Pt in agreement w/ plan. Patient demonstrates decision making capacity, NAD, VSS. Stable for d/c..  Discharge instructions performed in [place],  #[id number].  Also discussed plan with daughter Teto.

## 2024-03-12 NOTE — ED PROVIDER NOTE - CLINICAL SUMMARY MEDICAL DECISION MAKING FREE TEXT BOX
adult female here with 1.5 months of cough, subjective SOB. vss. no fever or hypoxia. s/p course of ABx for PNA. Could be chronic bronchitis. Pending CTA chest, labs, reassessment.

## 2024-03-12 NOTE — ED ADULT NURSE NOTE - OBJECTIVE STATEMENT
pt presented to er c/o shortness of breath and cough x1.5 months. pt reports she has taken abx without relief after being diagnosed with pneumonia 1.5 weeks ago. pt denies chest pain.

## 2024-03-12 NOTE — ED PROVIDER NOTE - PATIENT PORTAL LINK FT
You can access the FollowMyHealth Patient Portal offered by [facility] by registering at the following website: [email or web address]. By joining [employer]’s FollowMyHealth portal, you will also be able to view your health information using other applications (apps) compatible with our system.

## 2024-03-13 NOTE — ED STATDOCS - INTERPRETER NAME
Quality 226: Preventive Care And Screening: Tobacco Use: Screening And Cessation Intervention: Patient screened for tobacco use and is an ex/non-smoker Detail Level: Detailed [place]

## 2024-03-17 NOTE — ED STATDOCS - NS ED ATTENDING STATEMENT MOD
Yes
This was a shared visit with the GHISLAINE. I reviewed and verified the documentation and independently performed the documented:

## 2025-04-21 NOTE — H&P ADULT - ATTENDING COMMENTS
severe nausea/nausea/vomiting Patient is seen and examined with the resident, Agree with above assessment and plan. Patient admitted with Syncope. Will follow orthostatics and Echocardiogram. Will follow clinically in telemetry unit for any arrythmia.